# Patient Record
Sex: MALE | Race: OTHER | HISPANIC OR LATINO | URBAN - METROPOLITAN AREA
[De-identification: names, ages, dates, MRNs, and addresses within clinical notes are randomized per-mention and may not be internally consistent; named-entity substitution may affect disease eponyms.]

---

## 2020-03-01 ENCOUNTER — EMERGENCY (EMERGENCY)
Facility: HOSPITAL | Age: 50
LOS: 1 days | Discharge: HOME | End: 2020-03-01
Attending: EMERGENCY MEDICINE
Payer: COMMERCIAL

## 2020-03-01 VITALS
RESPIRATION RATE: 18 BRPM | HEIGHT: 68 IN | WEIGHT: 162.92 LBS | SYSTOLIC BLOOD PRESSURE: 130 MMHG | TEMPERATURE: 97 F | DIASTOLIC BLOOD PRESSURE: 83 MMHG | HEART RATE: 83 BPM | OXYGEN SATURATION: 97 %

## 2020-03-01 PROCEDURE — 99284 EMERGENCY DEPT VISIT MOD MDM: CPT

## 2020-03-01 RX ORDER — PANTOPRAZOLE SODIUM 20 MG/1
40 TABLET, DELAYED RELEASE ORAL ONCE
Refills: 0 | Status: COMPLETED | OUTPATIENT
Start: 2020-03-01 | End: 2020-03-01

## 2020-03-01 RX ORDER — ONDANSETRON 8 MG/1
4 TABLET, FILM COATED ORAL ONCE
Refills: 0 | Status: COMPLETED | OUTPATIENT
Start: 2020-03-01 | End: 2020-03-01

## 2020-03-01 RX ADMIN — PANTOPRAZOLE SODIUM 40 MILLIGRAM(S): 20 TABLET, DELAYED RELEASE ORAL at 23:49

## 2020-03-01 RX ADMIN — ONDANSETRON 4 MILLIGRAM(S): 8 TABLET, FILM COATED ORAL at 23:49

## 2020-03-02 VITALS
DIASTOLIC BLOOD PRESSURE: 70 MMHG | RESPIRATION RATE: 15 BRPM | HEART RATE: 92 BPM | TEMPERATURE: 98 F | OXYGEN SATURATION: 99 % | SYSTOLIC BLOOD PRESSURE: 115 MMHG

## 2020-03-02 LAB
ALBUMIN SERPL ELPH-MCNC: 3.5 G/DL — SIGNIFICANT CHANGE UP (ref 3.5–5)
ALP SERPL-CCNC: 51 U/L — SIGNIFICANT CHANGE UP (ref 40–120)
ALT FLD-CCNC: 26 U/L DA — SIGNIFICANT CHANGE UP (ref 10–60)
ANION GAP SERPL CALC-SCNC: 6 MMOL/L — SIGNIFICANT CHANGE UP (ref 5–17)
AST SERPL-CCNC: 11 U/L — SIGNIFICANT CHANGE UP (ref 10–40)
BASOPHILS # BLD AUTO: 0.02 K/UL — SIGNIFICANT CHANGE UP (ref 0–0.2)
BASOPHILS NFR BLD AUTO: 0.2 % — SIGNIFICANT CHANGE UP (ref 0–2)
BILIRUB SERPL-MCNC: 1.9 MG/DL — HIGH (ref 0.2–1.2)
BUN SERPL-MCNC: 15 MG/DL — SIGNIFICANT CHANGE UP (ref 7–18)
CALCIUM SERPL-MCNC: 8.1 MG/DL — LOW (ref 8.4–10.5)
CHLORIDE SERPL-SCNC: 106 MMOL/L — SIGNIFICANT CHANGE UP (ref 96–108)
CO2 SERPL-SCNC: 27 MMOL/L — SIGNIFICANT CHANGE UP (ref 22–31)
CREAT SERPL-MCNC: 1.04 MG/DL — SIGNIFICANT CHANGE UP (ref 0.5–1.3)
EOSINOPHIL # BLD AUTO: 0.15 K/UL — SIGNIFICANT CHANGE UP (ref 0–0.5)
EOSINOPHIL NFR BLD AUTO: 1.5 % — SIGNIFICANT CHANGE UP (ref 0–6)
GLUCOSE SERPL-MCNC: 97 MG/DL — SIGNIFICANT CHANGE UP (ref 70–99)
HCT VFR BLD CALC: 45.5 % — SIGNIFICANT CHANGE UP (ref 39–50)
HGB BLD-MCNC: 15.9 G/DL — SIGNIFICANT CHANGE UP (ref 13–17)
IMM GRANULOCYTES NFR BLD AUTO: 0.2 % — SIGNIFICANT CHANGE UP (ref 0–1.5)
LIDOCAIN IGE QN: 91 U/L — SIGNIFICANT CHANGE UP (ref 73–393)
LYMPHOCYTES # BLD AUTO: 0.93 K/UL — LOW (ref 1–3.3)
LYMPHOCYTES # BLD AUTO: 9.1 % — LOW (ref 13–44)
MCHC RBC-ENTMCNC: 33 PG — SIGNIFICANT CHANGE UP (ref 27–34)
MCHC RBC-ENTMCNC: 34.9 GM/DL — SIGNIFICANT CHANGE UP (ref 32–36)
MCV RBC AUTO: 94.4 FL — SIGNIFICANT CHANGE UP (ref 80–100)
MONOCYTES # BLD AUTO: 0.69 K/UL — SIGNIFICANT CHANGE UP (ref 0–0.9)
MONOCYTES NFR BLD AUTO: 6.8 % — SIGNIFICANT CHANGE UP (ref 2–14)
NEUTROPHILS # BLD AUTO: 8.37 K/UL — HIGH (ref 1.8–7.4)
NEUTROPHILS NFR BLD AUTO: 82.2 % — HIGH (ref 43–77)
NRBC # BLD: 0 /100 WBCS — SIGNIFICANT CHANGE UP (ref 0–0)
PLATELET # BLD AUTO: 187 K/UL — SIGNIFICANT CHANGE UP (ref 150–400)
POTASSIUM SERPL-MCNC: 3.4 MMOL/L — LOW (ref 3.5–5.3)
POTASSIUM SERPL-SCNC: 3.4 MMOL/L — LOW (ref 3.5–5.3)
PROT SERPL-MCNC: 7 G/DL — SIGNIFICANT CHANGE UP (ref 6–8.3)
RBC # BLD: 4.82 M/UL — SIGNIFICANT CHANGE UP (ref 4.2–5.8)
RBC # FLD: 12.7 % — SIGNIFICANT CHANGE UP (ref 10.3–14.5)
SODIUM SERPL-SCNC: 139 MMOL/L — SIGNIFICANT CHANGE UP (ref 135–145)
TROPONIN I SERPL-MCNC: <0.015 NG/ML — SIGNIFICANT CHANGE UP (ref 0–0.04)
TROPONIN I SERPL-MCNC: <0.015 NG/ML — SIGNIFICANT CHANGE UP (ref 0–0.04)
WBC # BLD: 10.18 K/UL — SIGNIFICANT CHANGE UP (ref 3.8–10.5)
WBC # FLD AUTO: 10.18 K/UL — SIGNIFICANT CHANGE UP (ref 3.8–10.5)

## 2020-03-02 PROCEDURE — 93005 ELECTROCARDIOGRAM TRACING: CPT

## 2020-03-02 PROCEDURE — 80053 COMPREHEN METABOLIC PANEL: CPT

## 2020-03-02 PROCEDURE — 99284 EMERGENCY DEPT VISIT MOD MDM: CPT | Mod: 25

## 2020-03-02 PROCEDURE — 83690 ASSAY OF LIPASE: CPT

## 2020-03-02 PROCEDURE — 96375 TX/PRO/DX INJ NEW DRUG ADDON: CPT

## 2020-03-02 PROCEDURE — 84484 ASSAY OF TROPONIN QUANT: CPT

## 2020-03-02 PROCEDURE — 36415 COLL VENOUS BLD VENIPUNCTURE: CPT

## 2020-03-02 PROCEDURE — 96374 THER/PROPH/DIAG INJ IV PUSH: CPT

## 2020-03-02 PROCEDURE — 85027 COMPLETE CBC AUTOMATED: CPT

## 2020-03-02 NOTE — ED PROVIDER NOTE - PATIENT PORTAL LINK FT
You can access the FollowMyHealth Patient Portal offered by [facility] by registering at the following website: [email or web address]. By joining [employer]’s FollowMyHealth portal, you will also be able to view your health information using other applications (apps) compatible with our system.

## 2020-03-02 NOTE — ED PROVIDER NOTE - CLINICAL SUMMARY MEDICAL DECISION MAKING FREE TEXT BOX
48yo M with hx HIV on HAART, CD4 count 500 in Nov 2019 presents with abd pain and vomiting. labs show neg trop x2, initial EKG shows <5mm STD in II, III but repeat shows no STD, patient denies chest pain, cmp/lipase wnl.   Discussed above with patient. On reeval no recurrence of epigastric pain or vomiting. Patient stable for discharge to f/u with PMD.

## 2020-03-02 NOTE — ED ADULT NURSE NOTE - OBJECTIVE STATEMENT
pt c/o of indigestion and vomiting x 2 started yesterday. Denies any SOB. Denies chest pain, dizziness, and diarrhea. Denies fever

## 2020-03-02 NOTE — ED ADULT NURSE NOTE - NSIMPLEMENTINTERV_GEN_ALL_ED
Implemented All Universal Safety Interventions:  [place] to call system. Call bell, personal items and telephone within reach. Instruct patient to call for assistance. Room bathroom lighting operational. Non-slip footwear when patient is off stretcher. Physically safe environment: no spills, clutter or unnecessary equipment. Stretcher in lowest position, wheels locked, appropriate side rails in place.

## 2020-03-02 NOTE — ED PROVIDER NOTE - NSFOLLOWUPINSTRUCTIONS_ED_ALL_ED_FT
Followup with PMD for reevaluation.  Return to ED if you develop fever>100.8F, severe abd pain or vomiting/unable to keep down liquids.

## 2020-03-02 NOTE — ED PROVIDER NOTE - OBJECTIVE STATEMENT
50yo M with hx HIV on HAART, CD4 count 500 in Nov 2019 presents with abd pain and vomiting. patient reports onset of epigastric abd disccomfort earlier yesterday associated with 2 episodes of vomiting. Currently feels acid reflux symptoms but denies abd pain. Denies diarrhea, fevers, sick contacts or recent travel.

## 2020-04-26 ENCOUNTER — MESSAGE (OUTPATIENT)
Age: 50
End: 2020-04-26

## 2020-04-30 ENCOUNTER — APPOINTMENT (OUTPATIENT)
Dept: DISASTER EMERGENCY | Facility: HOSPITAL | Age: 50
End: 2020-04-30

## 2020-04-30 LAB
SARS-COV-2 IGG SERPL IA-ACNC: 0 INDEX
SARS-COV-2 IGG SERPL QL IA: NEGATIVE

## 2021-05-09 ENCOUNTER — EMERGENCY (EMERGENCY)
Facility: HOSPITAL | Age: 51
LOS: 1 days | Discharge: HOME | End: 2021-05-09
Attending: STUDENT IN AN ORGANIZED HEALTH CARE EDUCATION/TRAINING PROGRAM
Payer: COMMERCIAL

## 2021-05-09 VITALS
HEIGHT: 68 IN | SYSTOLIC BLOOD PRESSURE: 168 MMHG | WEIGHT: 159.84 LBS | RESPIRATION RATE: 16 BRPM | DIASTOLIC BLOOD PRESSURE: 90 MMHG | OXYGEN SATURATION: 98 % | HEART RATE: 78 BPM | TEMPERATURE: 99 F

## 2021-05-09 VITALS
TEMPERATURE: 99 F | HEART RATE: 70 BPM | DIASTOLIC BLOOD PRESSURE: 90 MMHG | SYSTOLIC BLOOD PRESSURE: 155 MMHG | OXYGEN SATURATION: 97 % | RESPIRATION RATE: 17 BRPM

## 2021-05-09 PROCEDURE — 99284 EMERGENCY DEPT VISIT MOD MDM: CPT

## 2021-05-09 PROCEDURE — 73502 X-RAY EXAM HIP UNI 2-3 VIEWS: CPT | Mod: 26,LT

## 2021-05-09 PROCEDURE — 99284 EMERGENCY DEPT VISIT MOD MDM: CPT | Mod: 25

## 2021-05-09 PROCEDURE — 73552 X-RAY EXAM OF FEMUR 2/>: CPT | Mod: 26,LT

## 2021-05-09 PROCEDURE — 73502 X-RAY EXAM HIP UNI 2-3 VIEWS: CPT

## 2021-05-09 PROCEDURE — 73552 X-RAY EXAM OF FEMUR 2/>: CPT

## 2021-05-09 RX ORDER — LIDOCAINE 4 G/100G
1 CREAM TOPICAL ONCE
Refills: 0 | Status: COMPLETED | OUTPATIENT
Start: 2021-05-09 | End: 2021-05-09

## 2021-05-09 RX ORDER — IBUPROFEN 200 MG
600 TABLET ORAL ONCE
Refills: 0 | Status: COMPLETED | OUTPATIENT
Start: 2021-05-09 | End: 2021-05-09

## 2021-05-09 RX ADMIN — Medication 600 MILLIGRAM(S): at 09:21

## 2021-05-09 RX ADMIN — LIDOCAINE 1 PATCH: 4 CREAM TOPICAL at 09:21

## 2021-05-09 NOTE — ED PROVIDER NOTE - PROGRESS NOTE DETAILS
took over from Dr. Kasper. patient had left thigh pain after a fall. non-tender on exam. ambulating without difficulty. no emergent findings on xray. will discharge. Valentin Wise

## 2021-05-09 NOTE — ED PROVIDER NOTE - PATIENT PORTAL LINK FT
You can access the FollowMyHealth Patient Portal offered by Health system by registering at the following website: [email or web address]. By joining [employer]’s FollowMyHealth portal, you will also be able to view your health information using other applications (apps) compatible with our system.

## 2021-05-09 NOTE — ED PROVIDER NOTE - NSFOLLOWUPINSTRUCTIONS_ED_ALL_ED_FT
You were seen in the emergency department for thigh pain after a fall.     Please follow-up with your primary care doctor in the next 24-48 hours.     Please take Ibuprofen and Tylenol as prescribed on the bottles for pain control.     If you have any worsening symptoms, severe leg pain or swelling, chest pain or shortness of breath, please return to the emergency department.

## 2021-05-09 NOTE — ED ADULT NURSE NOTE - ALCOHOL PRE SCREEN (AUDIT - C)
Pt discharged to home. Instructed to follow up with her PCP and GI specialist, and to follow up with them up tomorrow. Instructed to focus on clear liquids, and advance as tolerated. Instructed to take her meds as prescribed, and to return for worsening pain/fever/vomiting/other concerns. Pt affect calm/conversant, respirations regular/non labored/skin warm and dry. No distress noted.
Pt noted to be resting in bed calmly texting on cell phone. Affect calm/conversant, respirations regular/non labored, skin warm and dry. Adult non verbal pain scale 0. Pt given meds per order; purpose of medications explained/understood. Affect is calm, respirations regular/non labored, skin warm/dry. Dr Felicity Donald has reevaluated pt at bedside; being discharged to home.
Statement Selected

## 2021-05-09 NOTE — ED ADULT NURSE NOTE - ISOLATION TYPE:
Lai Del Rosario  65 y.o. male      1. Atherosclerosis of native coronary artery of native heart without angina pectoris    2. Mixed hyperlipidemia    3. Essential hypertension        History of Present Illness:  Mr. Del Rosario is here for follow-up of his above-stated problems.  He has done well from a symptom standpoint with no chest pain, shortness of breath, palpitation, dizziness or syncope.  He has been physically quite active without any restrictions.  Exam today was unremarkable and his heart rate and blood pressure were in the normal range.  His lipid profile checked in June this year showed HDL of 64 and LDL of 44.    SUBJECTIVE    Allergies   Allergen Reactions   • Meperidine Rash         Past Medical History:   Diagnosis Date   • Hyperlipidemia    • Hypertension          Past Surgical History:   Procedure Laterality Date   • MOLE REMOVAL           Family History   Problem Relation Age of Onset   • Heart disease Father    • Heart attack Father          Social History     Socioeconomic History   • Marital status:      Spouse name: Not on file   • Number of children: Not on file   • Years of education: Not on file   • Highest education level: Not on file   Tobacco Use   • Smoking status: Never Smoker   • Smokeless tobacco: Current User     Types: Chew   Substance and Sexual Activity   • Alcohol use: Yes     Alcohol/week: 2.0 standard drinks     Types: 2 Cans of beer per week     Comment: social   • Drug use: No   • Sexual activity: Defer         Current Outpatient Medications   Medication Sig Dispense Refill   • atorvastatin (LIPITOR) 10 MG tablet Take 1 tablet by mouth Daily. 90 tablet 3   • irbesartan (AVAPRO) 150 MG tablet Take 1 tablet by mouth Daily. 90 tablet 3   • metoprolol succinate XL (TOPROL-XL) 50 MG 24 hr tablet Take 1 tablet by mouth Daily. 90 tablet 3     No current facility-administered medications for this visit.          OBJECTIVE    /80 (BP Location: Left arm, Patient  "Position: Sitting, Cuff Size: Adult)   Pulse 71   Ht 182.9 cm (72\")   Wt 81.2 kg (179 lb)   SpO2 99%   BMI 24.28 kg/m²         Review of Systems     Constitutional:  Denies recent weight loss, weight gain, fever or chills, no change in exercise tolerance     HENT:  Denies any hearing loss, epistaxis, hoarseness, or difficulty speaking.     Eyes: Wears eyeglasses or contact lenses     Respiratory:  Denies dyspnea with exertion,no cough, wheezing, or hemoptysis.     Cardiovascular: Negative for palpations, chest pain, orthopnea, PND, peripheral edema, syncope, or claudication.     Gastrointestinal:  Denies change in bowel habits, dyspepsia, ulcer disease, hematochezia, or melena.     Endocrine: Negative for cold intolerance, heat intolerance, polydipsia, polyphagia and polyuria.     Genitourinary: Negative.      Musculoskeletal: Denies any history of arthritic symptoms or back problems     Neurological:  Denies any history of recurrent headaches, strokes, TIA, or seizure disorder.     Hematological: Denies any food allergies, seasonal allergies, bleeding disorders, or lymphadenopathy.     Psychiatric/Behavioral: Denies any history of depression, substance abuse, or change in cognitive function.         Physical Exam     Constitutional: Cooperative, alert and oriented,  in no acute distress.     HENT:   Head: Normocephalic, normal hair patterns, no masses or tenderness.  Ears, Nose, and Throat: No gross abnormalities. No pallor or cyanosis.   Eyes: EOMS intact, PERRL, conjunctivae and lids unremarkable. Fundoscopic exam and visual fields not performed.   Neck: No palpable masses or adenopathy, no thyromegaly, no JVD, carotid pulses are full and equal bilaterally and without  Bruits.     Cardiovascular: Regular rhythm, S1 and S2 normal, no S3 or S4.  No murmurs, gallops, or rubs detected.     Pulmonary/Chest: Chest: normal symmetry, no tenderness to palpation, normal respiratory excursion, no intercostal " retraction, no use of accessory muscles.            Pulmonary: Normal breath sounds. No rales or ronchi.    Abdominal: Abdomen soft, bowel sounds normoactive, no masses, no hepatosplenomegaly, non-tender, no bruits.     Musculoskeletal: No deformities, clubbing, cyanosis, erythema, or edema observed. There are no spinal abnormalities noted. Normal muscle strength and tone. Pulses full and equal in all extremities, no bruits auscultated.     Neurological: No gross motor or sensory deficits noted, affect appropriate, oriented to time, person, place.     Skin: Warm and dry to the touch, no apparent skin lesions or masses noted.     Psychiatric: He has a normal mood and affect. His behavior is normal. Judgment and thought content normal.         Procedures      Lab Results   Component Value Date    WBC 6.48 06/03/2020    HGB 13.4 06/03/2020    HCT 38.4 06/03/2020    MCV 90.6 06/03/2020     06/03/2020     Lab Results   Component Value Date    GLUCOSE 80 06/03/2020    BUN 8 06/03/2020    CREATININE 0.80 06/03/2020    EGFRIFNONA 97 06/03/2020    BCR 10.0 06/03/2020    CO2 25.6 06/03/2020    CALCIUM 9.0 06/03/2020    ALBUMIN 4.60 06/03/2020    LABIL2 1.6 03/07/2019    AST 44 (H) 06/03/2020    ALT 46 (H) 06/03/2020     Lab Results   Component Value Date    CHOL 118 06/03/2020    CHOL 175 09/19/2017     Lab Results   Component Value Date    TRIG 50 06/03/2020    TRIG 67 09/19/2017     Lab Results   Component Value Date    HDL 64 (H) 06/03/2020    HDL 69 09/19/2017     No components found for: LDLCALC  Lab Results   Component Value Date    LDL 44 06/03/2020     09/19/2017     No results found for: HDLLDLRATIO  No components found for: CHOLHDL  No results found for: HGBA1C  No results found for: TSH, N1GBRBN, B9RJPGL, THYROIDAB        ASSESSMENT AND PLAN  Mr. Del Rosario is stable with regards to his heart with no evidence of angina, arrhythmia or congestive heart failure.  He is physically active without any  restrictions.  I have continued antihypertensive therapy with Avapro and metoprolol succinate and lipid-lowering therapy with atorvastatin has been continued.   Medications were refilled.    Diagnoses and all orders for this visit:    1. Atherosclerosis of native coronary artery of native heart without angina pectoris (Primary)    2. Mixed hyperlipidemia    3. Essential hypertension    Other orders  -     atorvastatin (LIPITOR) 10 MG tablet; Take 1 tablet by mouth Daily.  Dispense: 90 tablet; Refill: 3  -     irbesartan (AVAPRO) 150 MG tablet; Take 1 tablet by mouth Daily.  Dispense: 90 tablet; Refill: 3  -     metoprolol succinate XL (TOPROL-XL) 50 MG 24 hr tablet; Take 1 tablet by mouth Daily.  Dispense: 90 tablet; Refill: 3        Patient's Body mass index is 24.28 kg/m². BMI is within normal parameters. No follow-up required..  Patient is a non-smoker    Rhett Jimenez MD  12/9/2020  16:54 CST   None

## 2021-05-09 NOTE — ED ADULT NURSE NOTE - OBJECTIVE STATEMENT
s/p fall Fell down from 2 steps at home with pain on left thigh happened at 930pm.pain scale 5/10. denies LOC & head trauma

## 2021-05-09 NOTE — ED PROVIDER NOTE - OBJECTIVE STATEMENT
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 From pain has been having difficulty ambulating normally since incident and unable to bear full weight on left leg. Denies any gross deformity, any numbness/tingling/back pain/neck pain/headaches. Has not taking anything yet for pain.

## 2021-11-30 ENCOUNTER — EMERGENCY (EMERGENCY)
Facility: HOSPITAL | Age: 51
LOS: 1 days | Discharge: HOME | End: 2021-11-30
Attending: EMERGENCY MEDICINE
Payer: COMMERCIAL

## 2021-11-30 VITALS
RESPIRATION RATE: 18 BRPM | OXYGEN SATURATION: 95 % | HEART RATE: 70 BPM | SYSTOLIC BLOOD PRESSURE: 133 MMHG | TEMPERATURE: 98 F | DIASTOLIC BLOOD PRESSURE: 82 MMHG

## 2021-11-30 VITALS
WEIGHT: 164.91 LBS | DIASTOLIC BLOOD PRESSURE: 94 MMHG | OXYGEN SATURATION: 96 % | HEIGHT: 68 IN | RESPIRATION RATE: 18 BRPM | TEMPERATURE: 99 F | HEART RATE: 116 BPM | SYSTOLIC BLOOD PRESSURE: 115 MMHG

## 2021-11-30 LAB
RAPID RVP RESULT: DETECTED
RV+EV RNA SPEC QL NAA+PROBE: DETECTED
SARS-COV-2 RNA SPEC QL NAA+PROBE: SIGNIFICANT CHANGE UP

## 2021-11-30 PROCEDURE — 93010 ELECTROCARDIOGRAM REPORT: CPT

## 2021-11-30 PROCEDURE — 94640 AIRWAY INHALATION TREATMENT: CPT

## 2021-11-30 PROCEDURE — 93005 ELECTROCARDIOGRAM TRACING: CPT

## 2021-11-30 PROCEDURE — 0225U NFCT DS DNA&RNA 21 SARSCOV2: CPT

## 2021-11-30 PROCEDURE — 71046 X-RAY EXAM CHEST 2 VIEWS: CPT

## 2021-11-30 PROCEDURE — 99284 EMERGENCY DEPT VISIT MOD MDM: CPT

## 2021-11-30 PROCEDURE — 96372 THER/PROPH/DIAG INJ SC/IM: CPT

## 2021-11-30 PROCEDURE — 71046 X-RAY EXAM CHEST 2 VIEWS: CPT | Mod: 26

## 2021-11-30 PROCEDURE — 99284 EMERGENCY DEPT VISIT MOD MDM: CPT | Mod: 25

## 2021-11-30 RX ORDER — KETOROLAC TROMETHAMINE 30 MG/ML
30 SYRINGE (ML) INJECTION ONCE
Refills: 0 | Status: DISCONTINUED | OUTPATIENT
Start: 2021-11-30 | End: 2021-11-30

## 2021-11-30 RX ORDER — ALBUTEROL 90 UG/1
2 AEROSOL, METERED ORAL ONCE
Refills: 0 | Status: COMPLETED | OUTPATIENT
Start: 2021-11-30 | End: 2021-11-30

## 2021-11-30 RX ADMIN — Medication 30 MILLIGRAM(S): at 09:08

## 2021-11-30 RX ADMIN — ALBUTEROL 2 PUFF(S): 90 AEROSOL, METERED ORAL at 09:08

## 2021-11-30 RX ADMIN — Medication 30 MILLIGRAM(S): at 09:53

## 2021-11-30 NOTE — ED PROVIDER NOTE - OBJECTIVE STATEMENT
51yoM with h/o HIV states sees doc q6mths, compliant with meds, and HIV is controlled, though does not recall viral load/CD4, presents with cough x 4 days. Associated rhinorrhea, postnasal drip, ST, and HA. Also reports some nonradiating midsternal chest pressure but essentially with coughing only. Denies SOB, fever, LE pain or swelling, v/d, abd pain, dysuria, rashes, and all other symptoms. 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 Works in the hospital. Declines , uses himself.  51yoM with h/o HIV states sees doc q6mths, compliant with meds, and HIV is controlled, though does not recall viral load/CD4, presents with cough x 4 days. Associated rhinorrhea, postnasal drip, ST, and HA. Also reports some nonradiating midsternal chest pressure but essentially with coughing only. Denies SOB, fever, LE pain or swelling, v/d, abd pain, dysuria, rashes, and all other symptoms. 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 Works in the hospital.

## 2021-11-30 NOTE — ED PROVIDER NOTE - CLINICAL SUMMARY MEDICAL DECISION MAKING FREE TEXT BOX
No e/o RAD, PTX, PNA, or fluid overload on exam or CXR _______. No e/o cardiomyopathy or myocarditis. No e/o RAD, PTX, PNA, or fluid overload on exam or CXR. No e/o cardiomyopathy or myocarditis. Cough c/w viral URI, RVP sent. Given alb inhaler with significant improvement in cough. Patient is well appearing, NAD, afebrile, hemodynamically stable, no WOB, appears well hydrated. Any available tests and studies were discussed with patient. Discharged with instructions in further symptomatic care, return precautions, and need for PMD f/u.

## 2021-11-30 NOTE — ED PROVIDER NOTE - NS ED ROS FT
All systems were reviewed and were otherwise negative. I have personally evaluated and examined the patient. The Attending was available to me as a supervising provider if needed.

## 2021-11-30 NOTE — ED PROVIDER NOTE - PATIENT PORTAL LINK FT
You can access the FollowMyHealth Patient Portal offered by [facility] by registering at the following website: [email or web address]. By joining [employer]’s FollowMyHealth portal, you will also be able to view your health information using other applications (apps) compatible with our system.

## 2021-11-30 NOTE — ED PROVIDER NOTE - PHYSICAL EXAMINATION
Afebrile, hemodynamically stable, saturating well on RA  NAD, well appearing, sitting comfortably in bed, no WOB, speaking full sentences  Head NCAT  EOMI grossly, anicteric  MMM, erythema with no lesions/exudates  No JVD or HJR  RRR, nml S1/S2, no m/r/g  Lungs CTAB, no w/r/r  Abd soft, NT, ND, nml BS, no rebound or guarding  AAO, CN's 3-12 grossly intact  STANTON spontaneously, no leg cyanosis or edema  Skin warm, well perfused, no rashes or hives

## 2021-11-30 NOTE — ED PROVIDER NOTE - NSFOLLOWUPINSTRUCTIONS_ED_ALL_ED_FT
Please follow up with your primary care doctor in 1-2 days.  Please use the albuterol 2 puffs every 4 hours as needed for cough.  Please return to the emergency department if you have worsening pain, shortness of breath, dizziness, vomiting, fever, or any other symptoms.      Acute Cough    WHAT YOU NEED TO KNOW:    An acute cough can last up to 3 weeks. Common causes of an acute cough include a cold, allergies, or a lung infection.    DISCHARGE INSTRUCTIONS:    Return to the emergency department if:   •You have trouble breathing or feel short of breath.  •You cough up blood, or you see blood in your mucus.  •You faint or feel weak or dizzy.  •You have chest pain when you cough or take a deep breath.  •You have new wheezing.    Contact your healthcare provider if:   •You have a fever.  •Your cough lasts longer than 4 weeks.  •Your symptoms do not improve with treatment.  •You have questions or concerns about your condition or care.    Medicines:   •Medicines may be needed to stop the cough, decrease swelling in your airways, or help open your airways. Medicine may also be given to help you cough up mucus. Ask your healthcare provider what over-the-counter medicines you can take. If you have an infection caused by bacteria, you may need antibiotics.  •Take your medicine as directed. Contact your healthcare provider if you think your medicine is not helping or if you have side effects. Tell him or her if you are allergic to any medicine. Keep a list of the medicines, vitamins, and herbs you take. Include the amounts, and when and why you take them. Bring the list or the pill bottles to follow-up visits. Carry your medicine list with you in case of an emergency.    Manage your symptoms:   •Do not smoke and stay away from others who smoke. Nicotine and other chemicals in cigarettes and cigars can cause lung damage and make your cough worse. Ask your healthcare provider for information if you currently smoke and need help to quit. E-cigarettes or smokeless tobacco still contain nicotine. Talk to your healthcare provider before you use these products.  •Drink extra liquids as directed. Liquids will help thin and loosen mucus so you can cough it up. Liquids will also help prevent dehydration. Examples of good liquids to drink include water, fruit juice, and broth. Do not drink liquids that contain caffeine. Caffeine can increase your risk for dehydration. Ask your healthcare provider how much liquid to drink each day.  •Rest as directed. Do not do activities that make your cough worse, such as exercise.  •Use a humidifier or vaporizer. Use a cool mist humidifier or a vaporizer to increase air moisture in your home. This may make it easier for you to breathe and help decrease your cough.  •Eat 2 to 5 mL of honey 2 times each day. Honey can help thin mucus and decrease your cough.  •Use cough drops or lozenges. These can help decrease throat irritation and your cough.    Follow up with your healthcare provider as directed: Write down your questions so you remember to ask them during your visits.

## 2021-11-30 NOTE — ED ADULT NURSE NOTE - NSIMPLEMENTINTERV_GEN_ALL_ED
Implemented All Universal Safety Interventions:  [place] to call system. Call bell, personal items and telephone within reach. Instruct patient to call for assistance. Room bathroom lighting operational. Non-slip footwear when patient is off stretcher. Physically safe environment: no spills, clutter or unnecessary equipment. Stretcher in lowest position, wheels locked, appropriate side rails in place.

## 2022-05-30 ENCOUNTER — EMERGENCY (EMERGENCY)
Facility: HOSPITAL | Age: 52
LOS: 1 days | Discharge: HOME | End: 2022-05-30
Attending: EMERGENCY MEDICINE
Payer: COMMERCIAL

## 2022-05-30 VITALS
HEIGHT: 68 IN | DIASTOLIC BLOOD PRESSURE: 85 MMHG | HEART RATE: 124 BPM | RESPIRATION RATE: 16 BRPM | TEMPERATURE: 100 F | WEIGHT: 174.17 LBS | SYSTOLIC BLOOD PRESSURE: 140 MMHG

## 2022-05-30 PROCEDURE — 99285 EMERGENCY DEPT VISIT HI MDM: CPT

## 2022-05-30 PROCEDURE — 99053 MED SERV 10PM-8AM 24 HR FAC: CPT

## 2022-05-30 NOTE — ED ADULT TRIAGE NOTE - CHIEF COMPLAINT QUOTE
" I have headache all day , I take tylenol it don't work "  Started this morning, my arms and legs hurts "

## 2022-05-31 VITALS
TEMPERATURE: 99 F | SYSTOLIC BLOOD PRESSURE: 124 MMHG | HEART RATE: 99 BPM | OXYGEN SATURATION: 97 % | DIASTOLIC BLOOD PRESSURE: 83 MMHG | RESPIRATION RATE: 17 BRPM

## 2022-05-31 LAB
ANION GAP SERPL CALC-SCNC: 7 MMOL/L — SIGNIFICANT CHANGE UP (ref 5–17)
APPEARANCE UR: CLEAR — SIGNIFICANT CHANGE UP
BASOPHILS # BLD AUTO: 0.04 K/UL — SIGNIFICANT CHANGE UP (ref 0–0.2)
BASOPHILS NFR BLD AUTO: 0.2 % — SIGNIFICANT CHANGE UP (ref 0–2)
BILIRUB UR-MCNC: NEGATIVE — SIGNIFICANT CHANGE UP
BUN SERPL-MCNC: 19 MG/DL — HIGH (ref 7–18)
CALCIUM SERPL-MCNC: 9.6 MG/DL — SIGNIFICANT CHANGE UP (ref 8.4–10.5)
CHLORIDE SERPL-SCNC: 101 MMOL/L — SIGNIFICANT CHANGE UP (ref 96–108)
CO2 SERPL-SCNC: 27 MMOL/L — SIGNIFICANT CHANGE UP (ref 22–31)
COLOR SPEC: YELLOW — SIGNIFICANT CHANGE UP
CREAT SERPL-MCNC: 1.25 MG/DL — SIGNIFICANT CHANGE UP (ref 0.5–1.3)
DIFF PNL FLD: ABNORMAL
EGFR: 69 ML/MIN/1.73M2 — SIGNIFICANT CHANGE UP
EOSINOPHIL # BLD AUTO: 0.04 K/UL — SIGNIFICANT CHANGE UP (ref 0–0.5)
EOSINOPHIL NFR BLD AUTO: 0.2 % — SIGNIFICANT CHANGE UP (ref 0–6)
GLUCOSE SERPL-MCNC: 104 MG/DL — HIGH (ref 70–99)
GLUCOSE UR QL: NEGATIVE — SIGNIFICANT CHANGE UP
HCT VFR BLD CALC: 47.1 % — SIGNIFICANT CHANGE UP (ref 39–50)
HGB BLD-MCNC: 17 G/DL — SIGNIFICANT CHANGE UP (ref 13–17)
IMM GRANULOCYTES NFR BLD AUTO: 0.5 % — SIGNIFICANT CHANGE UP (ref 0–1.5)
KETONES UR-MCNC: ABNORMAL
LEUKOCYTE ESTERASE UR-ACNC: NEGATIVE — SIGNIFICANT CHANGE UP
LYMPHOCYTES # BLD AUTO: 10.4 % — LOW (ref 13–44)
LYMPHOCYTES # BLD AUTO: 2 K/UL — SIGNIFICANT CHANGE UP (ref 1–3.3)
MCHC RBC-ENTMCNC: 33.5 PG — SIGNIFICANT CHANGE UP (ref 27–34)
MCHC RBC-ENTMCNC: 36.1 GM/DL — HIGH (ref 32–36)
MCV RBC AUTO: 92.9 FL — SIGNIFICANT CHANGE UP (ref 80–100)
MONOCYTES # BLD AUTO: 1.47 K/UL — HIGH (ref 0–0.9)
MONOCYTES NFR BLD AUTO: 7.6 % — SIGNIFICANT CHANGE UP (ref 2–14)
NEUTROPHILS # BLD AUTO: 15.66 K/UL — HIGH (ref 1.8–7.4)
NEUTROPHILS NFR BLD AUTO: 81.1 % — HIGH (ref 43–77)
NITRITE UR-MCNC: NEGATIVE — SIGNIFICANT CHANGE UP
NRBC # BLD: 0 /100 WBCS — SIGNIFICANT CHANGE UP (ref 0–0)
PH UR: 5 — SIGNIFICANT CHANGE UP (ref 5–8)
PLATELET # BLD AUTO: 225 K/UL — SIGNIFICANT CHANGE UP (ref 150–400)
POTASSIUM SERPL-MCNC: 4.3 MMOL/L — SIGNIFICANT CHANGE UP (ref 3.5–5.3)
POTASSIUM SERPL-SCNC: 4.3 MMOL/L — SIGNIFICANT CHANGE UP (ref 3.5–5.3)
PROT UR-MCNC: NEGATIVE — SIGNIFICANT CHANGE UP
RAPID RVP RESULT: SIGNIFICANT CHANGE UP
RAPID RVP RESULT: SIGNIFICANT CHANGE UP
RBC # BLD: 5.07 M/UL — SIGNIFICANT CHANGE UP (ref 4.2–5.8)
RBC # FLD: 12.4 % — SIGNIFICANT CHANGE UP (ref 10.3–14.5)
SARS-COV-2 RNA SPEC QL NAA+PROBE: SIGNIFICANT CHANGE UP
SARS-COV-2 RNA SPEC QL NAA+PROBE: SIGNIFICANT CHANGE UP
SODIUM SERPL-SCNC: 135 MMOL/L — SIGNIFICANT CHANGE UP (ref 135–145)
SP GR SPEC: 1.02 — SIGNIFICANT CHANGE UP (ref 1.01–1.02)
UROBILINOGEN FLD QL: NEGATIVE — SIGNIFICANT CHANGE UP
WBC # BLD: 19.3 K/UL — HIGH (ref 3.8–10.5)
WBC # FLD AUTO: 19.3 K/UL — HIGH (ref 3.8–10.5)

## 2022-05-31 PROCEDURE — 71046 X-RAY EXAM CHEST 2 VIEWS: CPT

## 2022-05-31 PROCEDURE — 96375 TX/PRO/DX INJ NEW DRUG ADDON: CPT

## 2022-05-31 PROCEDURE — 70450 CT HEAD/BRAIN W/O DYE: CPT | Mod: 26,MA

## 2022-05-31 PROCEDURE — 80048 BASIC METABOLIC PNL TOTAL CA: CPT

## 2022-05-31 PROCEDURE — 96374 THER/PROPH/DIAG INJ IV PUSH: CPT

## 2022-05-31 PROCEDURE — 85025 COMPLETE CBC W/AUTO DIFF WBC: CPT

## 2022-05-31 PROCEDURE — 71046 X-RAY EXAM CHEST 2 VIEWS: CPT | Mod: 26

## 2022-05-31 PROCEDURE — 81001 URINALYSIS AUTO W/SCOPE: CPT

## 2022-05-31 PROCEDURE — 36415 COLL VENOUS BLD VENIPUNCTURE: CPT

## 2022-05-31 PROCEDURE — 70450 CT HEAD/BRAIN W/O DYE: CPT | Mod: MA

## 2022-05-31 PROCEDURE — 0225U NFCT DS DNA&RNA 21 SARSCOV2: CPT

## 2022-05-31 PROCEDURE — 99285 EMERGENCY DEPT VISIT HI MDM: CPT | Mod: 25

## 2022-05-31 RX ORDER — ACETAMINOPHEN 500 MG
650 TABLET ORAL ONCE
Refills: 0 | Status: COMPLETED | OUTPATIENT
Start: 2022-05-31 | End: 2022-05-31

## 2022-05-31 RX ORDER — DIPHENHYDRAMINE HCL 50 MG
25 CAPSULE ORAL ONCE
Refills: 0 | Status: COMPLETED | OUTPATIENT
Start: 2022-05-31 | End: 2022-05-31

## 2022-05-31 RX ORDER — ACETAMINOPHEN 500 MG
2 TABLET ORAL
Qty: 20 | Refills: 0
Start: 2022-05-31

## 2022-05-31 RX ORDER — METOCLOPRAMIDE HCL 10 MG
10 TABLET ORAL ONCE
Refills: 0 | Status: COMPLETED | OUTPATIENT
Start: 2022-05-31 | End: 2022-05-31

## 2022-05-31 RX ORDER — ACETAMINOPHEN 500 MG
650 TABLET ORAL ONCE
Refills: 0 | Status: DISCONTINUED | OUTPATIENT
Start: 2022-05-31 | End: 2022-06-03

## 2022-05-31 RX ADMIN — Medication 25 MILLIGRAM(S): at 01:23

## 2022-05-31 RX ADMIN — Medication 10 MILLIGRAM(S): at 01:23

## 2022-05-31 RX ADMIN — Medication 650 MILLIGRAM(S): at 01:24

## 2022-05-31 NOTE — ED PROVIDER NOTE - OBJECTIVE STATEMENT
52 y/o male with no medical or surgical history comes in with a chief complaint of a diffused headache that started at 8am and progressively worsened. Patient has headache associated with generalized body aches especially to bilateral legs. He denies any fever, loss of consciousness, motor weakness or neck pain. NKDA.

## 2022-05-31 NOTE — ED ADULT NURSE NOTE - OBJECTIVE STATEMENT
No significant past surgical history
c/o headache since yesterday.pain scale 7/10 medication given as ordered

## 2022-05-31 NOTE — ED PROVIDER NOTE - NSFOLLOWUPCLINICS_GEN_ALL_ED_FT
[facility]  Internal Medicine  [address] [place] [address]  Phone: [phone]  Fax: [phone]

## 2022-05-31 NOTE — ED PROVIDER NOTE - CLINICAL SUMMARY MEDICAL DECISION MAKING FREE TEXT BOX
4a- Pt is asymptomatic, denies current headache, no meningeal signs. Pt requesting discharge. Leukocytosis noted, given pt's hx of body aches, suspect probable viral syndrome. Will also check CXR and urinalysis. 4a- Pt is asymptomatic, denies current headache, no meningeal signs. Pt requesting discharge. Leukocytosis noted, given pt's hx of body aches, suspect probable viral syndrome. Will also check CXR and urinalysis.  730a- Pt remains well and asymptomatic. Lab requested re-sample of RVP. pt will receive results via text.  Pt is well appearing, has no new complaints and able to walk with normal gait. Pt is stable for discharge and follow up with medical doctor. Pt educated on care and need for follow up. Discussed anticipatory guidance and return precautions. Questions answered. I had a detailed discussion with the patient and/or guardian regarding the historical points, exam findings, and any diagnostic results supporting the discharge diagnosis.

## 2022-05-31 NOTE — ED PROVIDER NOTE - PATIENT PORTAL LINK FT
You can access the FollowMyHealth Patient Portal offered by [facility] by registering at the following website: [email or web address]. By joining [employer]’s FollowMyHealth portal, you will also be able to view your health information using other applications (apps) compatible with our system.

## 2022-05-31 NOTE — ED PROVIDER NOTE - NSFOLLOWUPINSTRUCTIONS_ED_ALL_ED_FT
A headache is pain or discomfort felt around the head or neck area. The specific cause of a headache may not be found. There are many causes and types of headaches. A few common ones are:  •Tension headaches.      •Migraine headaches.      •Cluster headaches.      •Chronic daily headaches.        Follow these instructions at home:    Watch your condition for any changes. Let your health care provider know about them. Take these steps to help with your condition:      Managing pain                   •Take over-the-counter and prescription medicines only as told by your health care provider.      •Lie down in a dark, quiet room when you have a headache.    •If directed, put ice on your head and neck area:  •Put ice in a plastic bag.      •Place a towel between your skin and the bag.      •Leave the ice on for 20 minutes, 2–3 times per day.      •If directed, apply heat to the affected area. Use the heat source that your health care provider recommends, such as a moist heat pack or a heating pad.  •Place a towel between your skin and the heat source.      •Leave the heat on for 20–30 minutes.      •Remove the heat if your skin turns bright red. This is especially important if you are unable to feel pain, heat, or cold. You may have a greater risk of getting burned.        •Keep lights dim if bright lights bother you or make your headaches worse.      Eating and drinking     •Eat meals on a regular schedule.    •If you drink alcohol:•Limit how much you use to:   •0–1 drink a day for women.       • 0–2 drinks a day for men.         •Be aware of how much alcohol is in your drink. In the [place] one drink equals one 12 oz bottle of beer (355 mL), one 5 oz glass of wine (148 mL), or one 1½ oz glass of hard liquor (44 mL).        •Stop drinking caffeine, or decrease the amount of caffeine you drink.        General instructions    •Keep a headache journal to help find out what may trigger your headaches. For example, write down:  •What you eat and drink.      •How much sleep you get.      •Any change to your diet or medicines.        •Try massage or other relaxation techniques.      •Limit stress.      •Sit up straight, and do not tense your muscles.      • Do not use any products that contain nicotine or tobacco, such as cigarettes, e-cigarettes, and chewing tobacco. If you need help quitting, ask your health care provider.      •Exercise regularly as told by your health care provider.      •Sleep on a regular schedule. Get 7–9 hours of sleep each night, or the amount recommended by your health care provider.      •Keep all follow-up visits as told by your health care provider. This is important.        Contact a health care provider if:    •Your symptoms are not helped by medicine.      •You have a headache that is different from the usual headache.      •You have nausea or you vomit.      •You have a fever.        Get help right away if:    •Your headache becomes severe quickly.      •Your headache gets worse after moderate to intense physical activity.      •You have repeated vomiting.      •You have a stiff neck.      •You have a loss of vision.      •You have problems with speech.      •You have pain in the eye or ear.      •You have muscular weakness or loss of muscle control.      •You lose your balance or have trouble walking.      •You feel faint or pass out.      •You have confusion.      •You have a seizure.        Summary    •A headache is pain or discomfort felt around the head or neck area.      •There are many causes and types of headaches. In some cases, the cause may not be found.      •Keep a headache journal to help find out what may trigger your headaches. Watch your condition for any changes. Let your health care provider know about them.      •Contact a health care provider if you have a headache that is different from the usual headache, or if your symptoms are not helped by medicine.      •Get help right away if your headache becomes severe, you vomit, you have a loss of vision, you lose your balance, or you have a seizure.      This information is not intended to replace advice given to you by your health care provider. Make sure you discuss any questions you have with your health care provider.

## 2022-08-18 ENCOUNTER — EMERGENCY (EMERGENCY)
Facility: HOSPITAL | Age: 52
LOS: 1 days | Discharge: HOME | End: 2022-08-18
Attending: EMERGENCY MEDICINE
Payer: COMMERCIAL

## 2022-08-18 VITALS
HEIGHT: 68 IN | OXYGEN SATURATION: 96 % | HEART RATE: 77 BPM | RESPIRATION RATE: 17 BRPM | SYSTOLIC BLOOD PRESSURE: 132 MMHG | DIASTOLIC BLOOD PRESSURE: 89 MMHG | TEMPERATURE: 99 F

## 2022-08-18 PROCEDURE — 99283 EMERGENCY DEPT VISIT LOW MDM: CPT

## 2022-08-18 PROCEDURE — 0225U NFCT DS DNA&RNA 21 SARSCOV2: CPT

## 2022-08-18 PROCEDURE — 99284 EMERGENCY DEPT VISIT MOD MDM: CPT

## 2022-08-18 NOTE — ED PROVIDER NOTE - CHPI ED SYMPTOMS NEG
no abdominal pain/no cough/no diarrhea/no fever/no rash/no shortness of breath/no chills/no decreased eating/drinking

## 2022-08-18 NOTE — ED PROVIDER NOTE - PATIENT PORTAL LINK FT
You can access the FollowMyHealth Patient Portal offered by [facility] by registering at the following website: [email or web address]. By joining [employer]’s FollowMyHealth portal, you will also be able to view your health information using other applications (apps) compatible with our system.

## 2022-08-19 LAB
B PERT DNA SPEC QL NAA+PROBE: SIGNIFICANT CHANGE UP
C PNEUM DNA SPEC QL NAA+PROBE: SIGNIFICANT CHANGE UP
FLUAV H1 2009 PAND RNA SPEC QL NAA+PROBE: SIGNIFICANT CHANGE UP
FLUAV H1 RNA SPEC QL NAA+PROBE: SIGNIFICANT CHANGE UP
FLUAV H3 RNA SPEC QL NAA+PROBE: SIGNIFICANT CHANGE UP
FLUAV SUBTYP SPEC NAA+PROBE: SIGNIFICANT CHANGE UP
FLUBV RNA SPEC QL NAA+PROBE: SIGNIFICANT CHANGE UP
HADV DNA SPEC QL NAA+PROBE: SIGNIFICANT CHANGE UP
HCOV PNL SPEC NAA+PROBE: SIGNIFICANT CHANGE UP
HMPV RNA SPEC QL NAA+PROBE: SIGNIFICANT CHANGE UP
HPIV1 RNA SPEC QL NAA+PROBE: SIGNIFICANT CHANGE UP
HPIV2 RNA SPEC QL NAA+PROBE: SIGNIFICANT CHANGE UP
HPIV3 RNA SPEC QL NAA+PROBE: SIGNIFICANT CHANGE UP
HPIV4 RNA SPEC QL NAA+PROBE: SIGNIFICANT CHANGE UP
RAPID RVP RESULT: SIGNIFICANT CHANGE UP
RSV RNA SPEC QL NAA+PROBE: SIGNIFICANT CHANGE UP
RV+EV RNA SPEC QL NAA+PROBE: SIGNIFICANT CHANGE UP
SARS-COV-2 RNA SPEC QL NAA+PROBE: SIGNIFICANT CHANGE UP

## 2022-10-03 ENCOUNTER — EMERGENCY (EMERGENCY)
Facility: HOSPITAL | Age: 52
LOS: 1 days | Discharge: HOME | End: 2022-10-03
Attending: EMERGENCY MEDICINE
Payer: COMMERCIAL

## 2022-10-03 VITALS
RESPIRATION RATE: 16 BRPM | HEIGHT: 68 IN | OXYGEN SATURATION: 98 % | TEMPERATURE: 98 F | HEART RATE: 75 BPM | WEIGHT: 169.98 LBS | DIASTOLIC BLOOD PRESSURE: 92 MMHG | SYSTOLIC BLOOD PRESSURE: 142 MMHG

## 2022-10-03 LAB — SARS-COV-2 RNA SPEC QL NAA+PROBE: SIGNIFICANT CHANGE UP

## 2022-10-03 PROCEDURE — 99283 EMERGENCY DEPT VISIT LOW MDM: CPT

## 2022-10-03 PROCEDURE — 99053 MED SERV 10PM-8AM 24 HR FAC: CPT

## 2022-10-03 PROCEDURE — 87635 SARS-COV-2 COVID-19 AMP PRB: CPT

## 2022-10-03 NOTE — ED PROVIDER NOTE - NSFOLLOWUPCLINICS_GEN_ALL_ED_FT
[facility]  Internal Medicine  [address] [place] [address]  Phone: [phone]  Fax: [phone]

## 2022-10-03 NOTE — ED ADULT NURSE NOTE - PRO INTERPRETER NEED 2
"Chief Complaint   Patient presents with     Post-op Visit     Post-op or turb reduction     Height 1.778 m (5' 10\"), weight 95.7 kg (211 lb).    Batool Matias, EMT    "
Serbian

## 2022-10-03 NOTE — ED PROVIDER NOTE - PATIENT PORTAL LINK FT
You can access the FollowMyHealth Patient Portal offered by [facility] by registering at the following website: [email or web address]. By joining [email or web address]’s FollowMyHealth portal, you will also be able to view your health information using other applications (apps) compatible with our system.

## 2022-10-03 NOTE — ED PROVIDER NOTE - NSFOLLOWUPINSTRUCTIONS_ED_ALL_ED_FT
Return to ER immediately if feeling short of breath, having any pain, fever, coughing worsens, vomiting, weakness, any concerns. Take medications as instructed if they were prescribed.  See your primary doctor as soon as possible (1-2 days). If you need assistance with follow up appointment, you can contact our Care Coordinator at [phone].

## 2022-10-03 NOTE — ED PROVIDER NOTE - CLINICAL SUMMARY MEDICAL DECISION MAKING FREE TEXT BOX
Pt is nontoxic appearing, not hypoxic, no respiratory distress, not hypotensive.    Pt states compliant with antiretrovirals and VL is undetectable.   Pt is well appearing, has no new complaints and able to walk with normal gait. Pt is stable for discharge and follow up with medical doctor. Pt educated on care and need for follow up. Discussed anticipatory guidance and return precautions. Questions answered. I had a detailed discussion with the patient and/or guardian regarding the historical points, exam findings, and any diagnostic results supporting the discharge diagnosis.

## 2022-10-03 NOTE — ED ADULT TRIAGE NOTE - BSA (M2)
1.91 Cheek-To-Nose Interpolation Flap Text: A decision was made to reconstruct the defect utilizing an interpolation axial flap and a staged reconstruction.  A telfa template was made of the defect.  This telfa template was then used to outline the Cheek-To-Nose Interpolation flap.  The donor area for the pedicle flap was then injected with anesthesia.  The flap was excised through the skin and subcutaneous tissue down to the layer of the underlying musculature.  The interpolation flap was carefully excised within this deep plane to maintain its blood supply.  The edges of the donor site were undermined.   The donor site was closed in a primary fashion.  The pedicle was then rotated into position and sutured.  Once the tube was sutured into place, adequate blood supply was confirmed with blanching and refill.  The pedicle was then wrapped with xeroform gauze and dressed appropriately with a telfa and gauze bandage to ensure continued blood supply and protect the attached pedicle.

## 2022-10-03 NOTE — ED PROVIDER NOTE - OBJECTIVE STATEMENT
52-year-old male, patient with nonproductive coughing for 4 days.  No chest pain, no shortness of breath, no fever.  Pt requested COVID test.

## 2022-12-13 ENCOUNTER — EMERGENCY (EMERGENCY)
Facility: HOSPITAL | Age: 52
LOS: 1 days | Discharge: HOME | End: 2022-12-13
Attending: EMERGENCY MEDICINE
Payer: COMMERCIAL

## 2022-12-13 VITALS
WEIGHT: 174.17 LBS | SYSTOLIC BLOOD PRESSURE: 147 MMHG | RESPIRATION RATE: 19 BRPM | HEART RATE: 86 BPM | OXYGEN SATURATION: 98 % | DIASTOLIC BLOOD PRESSURE: 90 MMHG | TEMPERATURE: 99 F

## 2022-12-13 PROCEDURE — 87637 SARSCOV2&INF A&B&RSV AMP PRB: CPT

## 2022-12-13 PROCEDURE — 99283 EMERGENCY DEPT VISIT LOW MDM: CPT

## 2022-12-13 RX ORDER — FLUORESCEIN SODIUM 9 MG
1 STRIP OPHTHALMIC (EYE) ONCE
Refills: 0 | Status: COMPLETED | OUTPATIENT
Start: 2022-12-13 | End: 2022-12-13

## 2022-12-13 RX ORDER — TOBRAMYCIN AND DEXAMETHASONE 1; 3 MG/ML; MG/ML
2 SUSPENSION/ DROPS OPHTHALMIC
Refills: 0 | Status: DISCONTINUED | OUTPATIENT
Start: 2022-12-13 | End: 2022-12-17

## 2022-12-14 VITALS
TEMPERATURE: 98 F | RESPIRATION RATE: 18 BRPM | HEART RATE: 79 BPM | SYSTOLIC BLOOD PRESSURE: 155 MMHG | DIASTOLIC BLOOD PRESSURE: 87 MMHG | OXYGEN SATURATION: 97 %

## 2022-12-14 LAB
FLUAV AG NPH QL: SIGNIFICANT CHANGE UP
FLUBV AG NPH QL: SIGNIFICANT CHANGE UP
SARS-COV-2 RNA SPEC QL NAA+PROBE: SIGNIFICANT CHANGE UP

## 2022-12-14 RX ORDER — TOBRAMYCIN AND DEXAMETHASONE 1; 3 MG/ML; MG/ML
2 SUSPENSION/ DROPS OPHTHALMIC
Qty: 5 | Refills: 0
Start: 2022-12-14 | End: 2022-12-15

## 2022-12-14 RX ADMIN — TOBRAMYCIN AND DEXAMETHASONE 2 DROP(S): 1; 3 SUSPENSION/ DROPS OPHTHALMIC at 00:26

## 2022-12-14 RX ADMIN — Medication 1 APPLICATION(S): at 00:14

## 2022-12-14 NOTE — ED PROVIDER NOTE - CLINICAL SUMMARY MEDICAL DECISION MAKING FREE TEXT BOX
Rx Tobradex for conjunctivitis with ocular inflammation.  Pt is well appearing, has no new complaints and able to walk with normal gait. Pt is stable for discharge and follow up with medical doctor. Pt educated on care and need for follow up. Discussed anticipatory guidance and return precautions. Questions answered. I had a detailed discussion with the patient and/or guardian regarding the historical points, exam findings, and any diagnostic results supporting the discharge diagnosis.

## 2022-12-14 NOTE — ED PROVIDER NOTE - PATIENT PORTAL LINK FT
You can access the FollowMyHealth Patient Portal offered by Health system by registering at the following website: [email or web address]. By joining [employer]’s FollowMyHealth portal, you will also be able to view your health information using other applications (apps) compatible with our system.

## 2022-12-14 NOTE — ED PROVIDER NOTE - OBJECTIVE STATEMENT
52-year-old male chief complaint of bilateral eye redness with itchy irritation and discharge.  Patient noted right eye redness first 4 days ago and left eye progressed with similar symptoms 3 days ago.  Patient denies trauma or foreign body to eyes.  Patient denies any acute vision changes.  Patient denies headache, no fever.  Patient states he is compliant with antiretroviral therapy and CD4 count is high and viral load is undetectable.

## 2022-12-14 NOTE — ED PROVIDER NOTE - NSFOLLOWUPINSTRUCTIONS_ED_ALL_ED_FT
Bacterial Conjunctivitis, Adult      Bacterial conjunctivitis is an infection of the clear membrane that covers the white part of the eye and the inner surface of the eyelid (conjunctiva). When the blood vessels in the conjunctiva become inflamed, the eye becomes red or pink. The eye often feels irritated or itchy. Bacterial conjunctivitis spreads easily from person to person (is contagious). It also spreads easily from one eye to the other eye.      What are the causes?    This condition is caused by bacteria. You may get the infection if you come into close contact with:  •A person who is infected with the bacteria.      •Items that are contaminated with the bacteria, such as a face towel, contact lens solution, or eye makeup.        What increases the risk?    You are more likely to develop this condition if:  •You are exposed to other people who have the infection.      •You wear contact lenses.      •You have a sinus infection.      •You have had a recent eye injury or surgery.      •You have a weak body defense system (immune system).      •You have a medical condition that causes dry eyes.        What are the signs or symptoms?  A normal eye compared to an eye with bacterial conjunctivitis.    Symptoms of this condition include:  •Thick, yellowish discharge from the eye. This may turn into a crust on the eyelid overnight and cause your eyelids to stick together.      •Tearing or watery eyes.      •Itchy eyes.      •Burning feeling in your eyes.      •Eye redness.      •Swollen eyelids.      •Blurred vision.        How is this diagnosed?    This condition is diagnosed based on your symptoms and medical history. Your health care provider may also take a sample of discharge from your eye to find the cause of your infection.      How is this treated?  A person putting eye drops in an eye.   This condition may be treated with:  •Antibiotic eye drops or ointment to clear the infection more quickly and prevent the spread of infection to others.      •Antibiotic medicines taken by mouth (orally) to treat infections that do not respond to drops or ointments or that last longer than 10 days.      •Cool, wet cloths (cool compresses) placed on the eyes.      •Artificial tears applied 2–6 times a day.        Follow these instructions at home:    Medicines     •Take or apply your antibiotic medicine as told by your health care provider. Do not stop using the antibiotic, even if your condition improves, unless directed by your health care provider.      •Take or apply over-the-counter and prescription medicines only as told by your health care provider.      •Be very careful to avoid touching the edge of your eyelid with the eye-drop bottle or the ointment tube when you apply medicines to the affected eye. This will keep you from spreading the infection to your other eye or to other people.      Managing discomfort     •Gently wipe away any drainage from your eye with a warm, wet washcloth or a cotton ball.      •Apply a clean, cool compress to your eye for 10–20 minutes, 3–4 times a day.      General instructions     • Do not wear contact lenses until the inflammation is gone and your health care provider says it is safe to wear them again. Ask your health care provider how to sterilize or replace your contact lenses before you use them again. Wear glasses until you can resume wearing contact lenses.      •Avoid wearing eye makeup until the inflammation is gone. Throw away any old eye cosmetics that may be contaminated.      •Change or wash your pillowcase every day.      • Do not share towels or washcloths. This may spread the infection.      •Wash your hands often with soap and water for at least 20 seconds and especially before touching your face or eyes. Use paper towels to dry your hands.      •Avoid touching or rubbing your eyes.      • Do not drive or use heavy machinery if your vision is blurred.        Contact a health care provider if:    •You have a fever.      •Your symptoms do not get better after 10 days.        Get help right away if:    •You have a fever and your symptoms suddenly get worse.      •You have severe pain when you move your eye.      •You have facial pain, redness, or swelling.      •You have a sudden loss of vision.        Summary    •Bacterial conjunctivitis is an infection of the clear membrane that covers the white part of the eye and the inner surface of the eyelid (conjunctiva).      •Bacterial conjunctivitis spreads easily from eye to eye and from person to person (is contagious).      •Wash your hands often with soap and water for at least 20 seconds and especially before touching your face or eyes. Use paper towels to dry your hands.      •Take or apply your antibiotic medicine as told by your health care provider. Do not stop using the antibiotic even if your condition improves.      •Contact a health care provider if you have a fever or if your symptoms do not get better after 10 days. Get help right away if you have a sudden loss of vision.      This information is not intended to replace advice given to you by your health care provider. Make sure you discuss any questions you have with your health care provider.

## 2022-12-14 NOTE — ED PROVIDER NOTE - PHYSICAL EXAMINATION
No distress  Extra ocular muscles intact, pupils 3mm and reactive to light, no photophobia, no pain with eye movement, no hyphema   OD/OS 20/20  OD IOP 14 ,OS IOP 17  No increased fluorescein uptake.  B/l conjunctival injection with discharge at medial canthus.

## 2023-01-03 NOTE — ED PROVIDER NOTE - CONSTITUTIONAL NEGATIVE STATEMENT, MLM
Mom has strep and would like to know if this appointment today can be a video visit? Please call.    no fever and no chills.

## 2023-02-28 ENCOUNTER — EMERGENCY (EMERGENCY)
Facility: HOSPITAL | Age: 53
LOS: 1 days | Discharge: HOME | End: 2023-02-28
Attending: EMERGENCY MEDICINE
Payer: COMMERCIAL

## 2023-02-28 VITALS
OXYGEN SATURATION: 96 % | DIASTOLIC BLOOD PRESSURE: 89 MMHG | SYSTOLIC BLOOD PRESSURE: 156 MMHG | WEIGHT: 169.98 LBS | RESPIRATION RATE: 18 BRPM | HEART RATE: 66 BPM | TEMPERATURE: 98 F

## 2023-02-28 PROCEDURE — 99283 EMERGENCY DEPT VISIT LOW MDM: CPT

## 2023-02-28 PROCEDURE — 99284 EMERGENCY DEPT VISIT MOD MDM: CPT

## 2023-02-28 RX ORDER — DIAZEPAM 5 MG
1 TABLET ORAL
Qty: 5 | Refills: 0
Start: 2023-02-28 | End: 2023-03-04

## 2023-02-28 RX ORDER — DEXAMETHASONE 0.5 MG/5ML
10 ELIXIR ORAL ONCE
Refills: 0 | Status: COMPLETED | OUTPATIENT
Start: 2023-02-28 | End: 2023-02-28

## 2023-02-28 RX ORDER — LIDOCAINE 4 G/100G
1 CREAM TOPICAL
Qty: 14 | Refills: 0
Start: 2023-02-28 | End: 2023-03-13

## 2023-02-28 RX ORDER — IBUPROFEN 200 MG
1 TABLET ORAL
Qty: 30 | Refills: 0
Start: 2023-02-28

## 2023-02-28 RX ORDER — ACETAMINOPHEN 500 MG
650 TABLET ORAL ONCE
Refills: 0 | Status: COMPLETED | OUTPATIENT
Start: 2023-02-28 | End: 2023-02-28

## 2023-02-28 RX ORDER — IBUPROFEN 200 MG
600 TABLET ORAL ONCE
Refills: 0 | Status: COMPLETED | OUTPATIENT
Start: 2023-02-28 | End: 2023-02-28

## 2023-02-28 RX ADMIN — Medication 600 MILLIGRAM(S): at 13:01

## 2023-02-28 RX ADMIN — Medication 10 MILLIGRAM(S): at 13:02

## 2023-02-28 RX ADMIN — Medication 650 MILLIGRAM(S): at 13:01

## 2023-02-28 NOTE — ED PROVIDER NOTE - PHYSICAL EXAMINATION
GENERAL: well appearing, no acute distress   HEAD: atraumatic   EYES: EOMI   ENT: moist oral mucosa   CARDIAC: regular rate  RESPIRATORY: no increased work of breathing   MUSCULOSKELETAL: no deformity; no lumbar ttp   NEUROLOGICAL: AAOx3, CN's II-XII intact, strength 5/5 bilateral UE and LE, sensation intact to light touch, finger to nose intact, steady gait  SKIN: no visible rash  PSYCHIATRIC: cooperative

## 2023-02-28 NOTE — ED PROVIDER NOTE - OBJECTIVE STATEMENT
53 yo M pmh of HIV  c/o R buttocks pain w/ radiation to R leg x 1 month, but worse x a few days. Works as transporter.  Denies fever, trauma, weakness, incontinence, other acute complaints

## 2023-02-28 NOTE — ED PROVIDER NOTE - PATIENT PORTAL LINK FT
You can access the FollowMyHealth Patient Portal offered by [facility] by registering at the following website: [email or web address]. By joining [employer]’s FollowMyHealth portal, you will also be able to view your health information using other applications (apps) compatible with our system.

## 2023-02-28 NOTE — ED PROVIDER NOTE - CLINICAL SUMMARY MEDICAL DECISION MAKING FREE TEXT BOX
53 yo M with symptoms of sciatica  No red flags/concerning features  Ambulatory  Dc w/ supportive care and PCP fu for possible PT, MRI prn, etc   Discussed indications for patient return to ED. Patient understood.

## 2023-03-22 ENCOUNTER — EMERGENCY (EMERGENCY)
Facility: HOSPITAL | Age: 53
LOS: 1 days | Discharge: HOME | End: 2023-03-22
Attending: STUDENT IN AN ORGANIZED HEALTH CARE EDUCATION/TRAINING PROGRAM
Payer: COMMERCIAL

## 2023-03-22 VITALS
DIASTOLIC BLOOD PRESSURE: 84 MMHG | WEIGHT: 173.94 LBS | HEART RATE: 65 BPM | RESPIRATION RATE: 17 BRPM | HEIGHT: 68 IN | SYSTOLIC BLOOD PRESSURE: 132 MMHG | TEMPERATURE: 98 F | OXYGEN SATURATION: 96 %

## 2023-03-22 PROCEDURE — 99284 EMERGENCY DEPT VISIT MOD MDM: CPT

## 2023-03-22 PROCEDURE — 99283 EMERGENCY DEPT VISIT LOW MDM: CPT

## 2023-03-22 RX ORDER — LIDOCAINE 4 G/100G
1 CREAM TOPICAL ONCE
Refills: 0 | Status: COMPLETED | OUTPATIENT
Start: 2023-03-22 | End: 2023-03-22

## 2023-03-22 RX ORDER — DIAZEPAM 5 MG
2 TABLET ORAL ONCE
Refills: 0 | Status: DISCONTINUED | OUTPATIENT
Start: 2023-03-22 | End: 2023-03-22

## 2023-03-22 RX ORDER — ACETAMINOPHEN 500 MG
2 TABLET ORAL
Qty: 40 | Refills: 0
Start: 2023-03-22

## 2023-03-22 RX ORDER — LIDOCAINE 4 G/100G
1 CREAM TOPICAL
Qty: 7 | Refills: 0
Start: 2023-03-22

## 2023-03-22 RX ORDER — ACETAMINOPHEN 500 MG
650 TABLET ORAL ONCE
Refills: 0 | Status: COMPLETED | OUTPATIENT
Start: 2023-03-22 | End: 2023-03-22

## 2023-03-22 RX ORDER — DIAZEPAM 5 MG
1 TABLET ORAL
Qty: 10 | Refills: 0
Start: 2023-03-22

## 2023-03-22 RX ORDER — IBUPROFEN 200 MG
1 TABLET ORAL
Qty: 20 | Refills: 0
Start: 2023-03-22

## 2023-03-22 RX ADMIN — Medication 2 MILLIGRAM(S): at 13:01

## 2023-03-22 RX ADMIN — Medication 500 MILLIGRAM(S): at 13:01

## 2023-03-22 RX ADMIN — Medication 650 MILLIGRAM(S): at 13:01

## 2023-03-22 RX ADMIN — LIDOCAINE 1 PATCH: 4 CREAM TOPICAL at 13:00

## 2023-03-22 NOTE — ED PROVIDER NOTE - PHYSICAL EXAMINATION
Positive right straight leg test. Niacinamide Pregnancy And Lactation Text: These medications are considered safe during pregnancy.

## 2023-03-22 NOTE — ED ADULT TRIAGE NOTE - NS ED NURSE DIRECT TO ROOM YN
[FreeTextEntry1] : costochondritis - mobic course. Possible adverse effects discussed with pt.\par migraines - refill imitrex prn Yes

## 2023-03-22 NOTE — ED PROVIDER NOTE - OBJECTIVE STATEMENT
52 year old male presenting for right sided back pain radiating to the right leg. Patient endorses similar pain to his sciatica. No numbness, weakness, urinary retention/incontinence, trauma, fall, or drug use.

## 2023-03-22 NOTE — ED PROVIDER NOTE - PATIENT PORTAL LINK FT
You can access the FollowMyHealth Patient Portal offered by [facility] by registering at the following website: [email or web address]. By joining [employer]’s FollowMyHealth portal, you will also be able to view your health information using other applications (apps) compatible with our system.

## 2023-03-22 NOTE — ED PROVIDER NOTE - CLINICAL SUMMARY MEDICAL DECISION MAKING FREE TEXT BOX
Patient presenting with back pain. no redflag, no sign of caude. exam consistent with sciatica. history of similar pain in the past. will treat pain, reassess. patient state he has pmd f.u tomorrow.

## 2023-03-30 ENCOUNTER — EMERGENCY (EMERGENCY)
Facility: HOSPITAL | Age: 53
LOS: 1 days | Discharge: HOME | End: 2023-03-30
Attending: STUDENT IN AN ORGANIZED HEALTH CARE EDUCATION/TRAINING PROGRAM
Payer: COMMERCIAL

## 2023-03-30 VITALS — HEIGHT: 68 IN

## 2023-03-30 PROCEDURE — 99282 EMERGENCY DEPT VISIT SF MDM: CPT

## 2023-03-30 PROCEDURE — 99284 EMERGENCY DEPT VISIT MOD MDM: CPT

## 2023-03-30 NOTE — ED ADULT TRIAGE NOTE - CHIEF COMPLAINT QUOTE
Alison Nelson , I was not involved in the care of this patient and am only entering information to back log for downtime. please see paper chart as the mandatory fields in the disposition section may not be accurate.  need pain medication for Rt leg pain

## 2023-03-31 NOTE — DOWNTIME INTERRUPTION NOTE - WHICH MANUAL FORMS INITIATED?
Henry Black, PGY-3, MD: I was not involved in the care of this patient and am only entering information to back log for downtime. Regarding Emergency Department care during this visit/admission, please see paper chart for isolation and medical management details, as the mandatory fields in the disposition section may not be accurate.

## 2023-06-01 ENCOUNTER — EMERGENCY (EMERGENCY)
Facility: HOSPITAL | Age: 53
LOS: 1 days | Discharge: HOME | End: 2023-06-01
Attending: EMERGENCY MEDICINE
Payer: COMMERCIAL

## 2023-06-01 VITALS
WEIGHT: 164.02 LBS | OXYGEN SATURATION: 95 % | SYSTOLIC BLOOD PRESSURE: 153 MMHG | DIASTOLIC BLOOD PRESSURE: 93 MMHG | TEMPERATURE: 98 F | RESPIRATION RATE: 18 BRPM | HEART RATE: 64 BPM

## 2023-06-01 PROCEDURE — 99283 EMERGENCY DEPT VISIT LOW MDM: CPT

## 2023-06-01 PROCEDURE — 99284 EMERGENCY DEPT VISIT MOD MDM: CPT

## 2023-06-01 RX ORDER — METHOCARBAMOL 500 MG/1
2 TABLET, FILM COATED ORAL
Qty: 40 | Refills: 0
Start: 2023-06-01 | End: 2023-06-10

## 2023-06-01 RX ORDER — IBUPROFEN 200 MG
1 TABLET ORAL
Qty: 30 | Refills: 0
Start: 2023-06-01 | End: 2023-06-10

## 2023-06-01 RX ORDER — DIAZEPAM 5 MG
2 TABLET ORAL ONCE
Refills: 0 | Status: DISCONTINUED | OUTPATIENT
Start: 2023-06-01 | End: 2023-06-01

## 2023-06-01 RX ORDER — GABAPENTIN 400 MG/1
300 CAPSULE ORAL ONCE
Refills: 0 | Status: COMPLETED | OUTPATIENT
Start: 2023-06-01 | End: 2023-06-01

## 2023-06-01 RX ORDER — GABAPENTIN 400 MG/1
1 CAPSULE ORAL
Qty: 90 | Refills: 0
Start: 2023-06-01 | End: 2023-06-30

## 2023-06-01 RX ORDER — IBUPROFEN 200 MG
600 TABLET ORAL ONCE
Refills: 0 | Status: COMPLETED | OUTPATIENT
Start: 2023-06-01 | End: 2023-06-01

## 2023-06-01 RX ADMIN — GABAPENTIN 300 MILLIGRAM(S): 400 CAPSULE ORAL at 16:04

## 2023-06-01 RX ADMIN — Medication 600 MILLIGRAM(S): at 16:34

## 2023-06-01 RX ADMIN — Medication 2 MILLIGRAM(S): at 16:04

## 2023-06-01 RX ADMIN — Medication 600 MILLIGRAM(S): at 16:04

## 2023-06-01 NOTE — ED PROVIDER NOTE - PHYSICAL EXAMINATION
Tenderness to lumbar area. Tenderness to buttock area.   Sensation and strength intact.   Peripheral pluses are equal and intact.

## 2023-06-01 NOTE — ED ADULT NURSE NOTE - OBJECTIVE STATEMENT
States he has on and off right lower back pain radiating to right leg worse since yesterday.Dnies fall

## 2023-06-01 NOTE — ED ADULT NURSE NOTE - NSFALLUNIVINTERV_ED_ALL_ED
Bed/Stretcher in lowest position, wheels locked, appropriate side rails in place/Call bell, personal items and telephone in reach/Instruct patient to call for assistance before getting out of bed/chair/stretcher/Non-slip footwear applied when patient is off stretcher/[place] to call system/Physically safe environment - no spills, clutter or unnecessary equipment/Purposeful proactive rounding/Room/bathroom lighting operational, light cord in reach

## 2023-06-01 NOTE — ED PROVIDER NOTE - PATIENT PORTAL LINK FT
You can access the FollowMyHealth Patient Portal offered by [facility] by registering at the following website: [email or web address]. By joining [employer]’s FollowMyHealth portal, you will also be able to view your health information using other applications (apps) compatible with our system.

## 2023-06-12 ENCOUNTER — EMERGENCY (EMERGENCY)
Facility: HOSPITAL | Age: 53
LOS: 1 days | Discharge: HOME | End: 2023-06-12
Payer: COMMERCIAL

## 2023-06-12 VITALS
HEART RATE: 82 BPM | WEIGHT: 173.5 LBS | DIASTOLIC BLOOD PRESSURE: 95 MMHG | SYSTOLIC BLOOD PRESSURE: 136 MMHG | TEMPERATURE: 98 F | RESPIRATION RATE: 18 BRPM | HEIGHT: 68 IN | OXYGEN SATURATION: 95 %

## 2023-06-12 PROCEDURE — 72131 CT LUMBAR SPINE W/O DYE: CPT | Mod: MA

## 2023-06-12 PROCEDURE — 99284 EMERGENCY DEPT VISIT MOD MDM: CPT

## 2023-06-12 PROCEDURE — 72131 CT LUMBAR SPINE W/O DYE: CPT | Mod: 26,MA

## 2023-06-12 PROCEDURE — 99284 EMERGENCY DEPT VISIT MOD MDM: CPT | Mod: 25

## 2023-06-12 NOTE — ED ADULT NURSE NOTE - OBJECTIVE STATEMENT
States he has right lower back pain radiating to right leg down to knee x7 months .States he needs CT Scan.

## 2023-06-12 NOTE — ED PROVIDER NOTE - MUSCULOSKELETAL, MLM
Lower bilateral strength intact and 5/5. Spine appears normal, range of motion is not limited, no muscle or joint tenderness

## 2023-06-12 NOTE — ED PROVIDER NOTE - PATIENT PORTAL LINK FT
You can access the FollowMyHealth Patient Portal offered by [facility] by registering at the following website: [email or web address]. By joining [employer]’s FollowMyHealth portal, you will also be able to view your health information using other applications (apps) compatible with our system.

## 2023-06-12 NOTE — ED PROVIDER NOTE - DISPOSITION TYPE
DISCHARGE Carac Pregnancy And Lactation Text: This medication is Pregnancy Category X and contraindicated in pregnancy and in women who may become pregnant. It is unknown if this medication is excreted in breast milk.

## 2023-06-12 NOTE — ED PROVIDER NOTE - OBJECTIVE STATEMENT
52 year old male with PMHx of right sided sciatica is presenting for request for CT scan. Pt has a pain management specialist and will meet this Friday. They wanted pt to get a CT of his lumbar spine prior to appointment. As per pt, the pain is similar to his usual pain with no new onset of worsening pain, numbness, tingling, focal weakness, saddle anesthesia, urinary/bowel incontinence, fever, or other symptoms. Pt is already taking Neurontin, ibuprofen, and Robaxin for the pain .

## 2023-06-12 NOTE — ED PROVIDER NOTE - CLINICAL SUMMARY MEDICAL DECISION MAKING FREE TEXT BOX
52 year old male with history of chronic sciatica is presenting for request for CT scan of lumbar spine. Pt is requested by his pain management specialist to get a CT prior to appointment this Friday. No new or worsening symptoms from baseline. 52 year old male with history of chronic sciatica is presenting for request for CT scan of lumbar spine. Pt is requested by his pain management specialist to get a CT prior to appointment this Friday. No new or worsening symptoms from baseline.    Discussed CT results with patient.  No acute findings.  Patient will follow-up as per appointment with pain specialist this week.

## 2023-06-12 NOTE — ED PROVIDER NOTE - NSFOLLOWUPINSTRUCTIONS_ED_ALL_ED_FT
Follow up with the pain specialist this week.  If you experience any new or worsening symptoms or if you are concerned you can always come back to the emergency for a re-evaluation.  If there were any prescriptions given to you during the visit today take them as prescribed. If you have any questions you can ask the pharmacist.

## 2023-06-12 NOTE — ED PROVIDER NOTE - HISTORY ATTESTATION, MLM
I have reviewed and confirmed nurses' notes... Topical Clindamycin Counseling: Patient counseled that this medication may cause skin irritation or allergic reactions.  In the event of skin irritation, the patient was advised to reduce the amount of the drug applied or use it less frequently.   The patient verbalized understanding of the proper use and possible adverse effects of clindamycin.  All of the patient's questions and concerns were addressed.

## 2023-06-12 NOTE — ED ADULT NURSE NOTE - NSFALLUNIVINTERV_ED_ALL_ED
Bed/Stretcher in lowest position, wheels locked, appropriate side rails in place/Call bell, personal items and telephone in reach/Instruct patient to call for assistance before getting out of bed/chair/stretcher/Non-slip footwear applied when patient is off stretcher/[place] to call system/Physically safe environment - no spills, clutter or unnecessary equipment/Purposeful proactive rounding/Room/bathroom lighting operational, light cord in reach

## 2023-06-12 NOTE — ED PROVIDER NOTE - NS ED ATTENDING NAME FT
Last OV 3/31/22    Last fill 3/23/22 #90 with 1     Next OV 9/28/22    Last Labs 9/16/22    
Dr JEFFRY Vanegas

## 2023-07-02 ENCOUNTER — EMERGENCY (EMERGENCY)
Facility: HOSPITAL | Age: 53
LOS: 1 days | Discharge: HOME | End: 2023-07-02
Attending: EMERGENCY MEDICINE
Payer: COMMERCIAL

## 2023-07-02 VITALS
OXYGEN SATURATION: 97 % | DIASTOLIC BLOOD PRESSURE: 91 MMHG | RESPIRATION RATE: 18 BRPM | HEART RATE: 77 BPM | SYSTOLIC BLOOD PRESSURE: 136 MMHG | TEMPERATURE: 98 F | WEIGHT: 164.91 LBS | HEIGHT: 68 IN

## 2023-07-02 PROCEDURE — 99283 EMERGENCY DEPT VISIT LOW MDM: CPT

## 2023-07-02 PROCEDURE — 99282 EMERGENCY DEPT VISIT SF MDM: CPT

## 2023-07-02 NOTE — ED PROVIDER NOTE - OBJECTIVE STATEMENT
52-year-old male, history of chronic right-sided sciatica, presents for evaluation with request for work note.  Reports that has chronic right-sided sciatica and was recently evaluated by pain management specialist and had an MRI done few days ago.  Waiting for results and follow-up reports that today the pain is worse.  Denies any new symptoms such as numbness, tingling, saddle anesthesia, focal weakness, urinary/bowel incontinence, difficulty walking, fever or any other complaints.

## 2023-07-02 NOTE — ED PROVIDER NOTE - ATTENDING APP SHARED VISIT CONTRIBUTION OF CARE
I was physically present for the E/M service provided. I agree with above history, physical, and plan which I have reviewed and edited where appropriate. I was physically present for the key portions of the service provided.    cr: pt with chronic back pain no red flags. states unable to come to work today. here for work note. advise to see employee health

## 2023-07-02 NOTE — ED ADULT NURSE NOTE - PRIMARY CARE PROVIDER
Post-Anesthesia Evaluation and Assessment Patient: Michell Branham MRN: [id number]  SSN: [id number] YOB: 1952  Age: 74 y.o. Sex: female Cardiovascular Function/Vital Signs Visit Vitals  /75  Pulse (!) 57  Temp 36.4 °C (97.5 °F)  Resp 17  Ht 5' 5\" (1.651 m)  Wt 70.3 kg (154 lb 14.4 oz)  SpO2 92%  BMI 25.78 kg/m2 Patient is status post general anesthesia for Procedure(s): RIGHT BREAST CAPSULECTOMY RIGHT BREAST IMPLANT INSERTION LEFT BREAST IMPLANT REMOVED AND NO NEW IMPLANT INSERTED  
 CYST EXCISION RIGHT SHOULDER. Nausea/Vomiting: None Postoperative hydration reviewed and adequate. Pain: 
Pain Scale 1: Numeric (0 - 10) (08/01/18 0920) Pain Intensity 1: 1 (08/01/18 1021) Managed Neurological Status:  
Neuro (WDL): Within Defined Limits (08/01/18 0615) Neuro Neurologic State: Drowsy (08/01/18 1021) Orientation Level: Oriented X4 (08/01/18 1021) Cognition: Follows commands (08/01/18 1021) Speech: Clear (08/01/18 1021) LUE Motor Response: Purposeful (08/01/18 1021) LLE Motor Response: Purposeful (08/01/18 1021) RUE Motor Response: Purposeful (08/01/18 1021) RLE Motor Response: Purposeful (08/01/18 1021) At baseline Mental Status and Level of Consciousness: Arousable Pulmonary Status:  
O2 Device: Room air (08/01/18 1021) Adequate oxygenation and airway patent Complications related to anesthesia: None Post-anesthesia assessment completed. No concerns. Doing well. Signed By: Jonny Lowry MD   
 August 1, 2018 unknown

## 2023-07-02 NOTE — ED ADULT NURSE NOTE - OBJECTIVE STATEMENT
States he has right States he has right buttocks  radiating to right leg for a few months .Ambulating with steady gait

## 2023-07-02 NOTE — ED ADULT NURSE NOTE - NSFALLUNIVINTERV_ED_ALL_ED
Bed/Stretcher in lowest position, wheels locked, appropriate side rails in place/Call bell, personal items and telephone in reach/Instruct patient to call for assistance before getting out of bed/chair/stretcher/Non-slip footwear applied when patient is off stretcher/[place] to call system/Physically safe environment - no spills, clutter or unnecessary equipment/Purposeful proactive rounding/Room/bathroom lighting operational, light cord in reach

## 2023-07-02 NOTE — ED PROVIDER NOTE - CLINICAL SUMMARY MEDICAL DECISION MAKING FREE TEXT BOX
52-year-old male, presents for request of work note.  Reports today pain is worse than usual.  Only takes Aleve and Tylenol.  No focal no deficit or neuro symptoms.  Advised to follow-up with PMD/pain management this week.  The ones to have more than 1 day out of work should follow-up with occupational health for [facility] tomorrow.  Discussed again red flags to come back to the hospital.

## 2023-07-02 NOTE — ED PROVIDER NOTE - NSFOLLOWUPINSTRUCTIONS_ED_ALL_ED_FT
Follow up with the occupational health at [place] on Monday.  Follow up with the primary care doctor in 2-3 days.  If you experience any new or worsening symptoms or if you are concerned you can always come back to the emergency for a re-evaluation.

## 2023-07-02 NOTE — ED PROVIDER NOTE - PATIENT PORTAL LINK FT
You can access the FollowMyHealth Patient Portal offered by [facility] by registering at the following website: [email or web address]. By joining [employer]’s FollowMyHealth portal, you will also be able to view your health information using other applications (apps) compatible with our system.

## 2023-07-03 PROBLEM — M54.30 SCIATICA, UNSPECIFIED SIDE: Chronic | Status: ACTIVE | Noted: 2023-06-12

## 2023-08-28 ENCOUNTER — EMERGENCY (EMERGENCY)
Facility: HOSPITAL | Age: 53
LOS: 1 days | Discharge: HOME | End: 2023-08-28
Attending: EMERGENCY MEDICINE
Payer: COMMERCIAL

## 2023-08-28 VITALS
HEIGHT: 68 IN | SYSTOLIC BLOOD PRESSURE: 141 MMHG | DIASTOLIC BLOOD PRESSURE: 97 MMHG | HEART RATE: 68 BPM | RESPIRATION RATE: 18 BRPM | TEMPERATURE: 98 F | WEIGHT: 169.98 LBS | OXYGEN SATURATION: 96 %

## 2023-08-28 LAB
FLUAV AG NPH QL: SIGNIFICANT CHANGE UP
FLUBV AG NPH QL: SIGNIFICANT CHANGE UP
SARS-COV-2 RNA SPEC QL NAA+PROBE: DETECTED

## 2023-08-28 PROCEDURE — 87637 SARSCOV2&INF A&B&RSV AMP PRB: CPT

## 2023-08-28 PROCEDURE — 99284 EMERGENCY DEPT VISIT MOD MDM: CPT

## 2023-08-28 PROCEDURE — 99283 EMERGENCY DEPT VISIT LOW MDM: CPT

## 2023-08-28 RX ORDER — NIRMATRELVIR AND RITONAVIR 150-100 MG
1 KIT ORAL
Qty: 1 | Refills: 0
Start: 2023-08-28 | End: 2023-09-01

## 2023-08-28 NOTE — ED PROVIDER NOTE - CLINICAL SUMMARY MEDICAL DECISION MAKING FREE TEXT BOX
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    Vitals stable.    Nontoxic appearing, n/v intact.  Airway intact, no respiratory distress, no hypoxia.  Patient already vaccinated for COVID    Plan to obtain:    -We will test for COVID.    COVID testing pending at time of discharge.    Paxlovid pack sent to pharmacy and patient instructed to call for results or check on application for results in the morning and if COVID testing is positive then to start taking the Paxlovid pack.    Patient stable for further care in outpatient setting. No indication for inpatient admission at this time. Patient advised regarding symptomatic & supportive care and symptoms to prompt ED return. Strict return precautions provided.

## 2023-08-28 NOTE — ED PROVIDER NOTE - OBJECTIVE STATEMENT
52 male with hx of HIV & sciatica.   Pt presenting to the ED reporting cough & congestion for the past few days.  + COVID exposure.  Patient is already vaccinated for COVID.

## 2023-08-28 NOTE — ED PROVIDER NOTE - PATIENT PORTAL LINK FT
You can access the FollowMyHealth Patient Portal offered by [facility] by registering at the following website: [email or web address]. By joining [employer]’s FollowMyHealth portal, you will also be able to view your health information using other applications (apps) compatible with our system.

## 2023-08-28 NOTE — ED PROVIDER NOTE - NSFOLLOWUPCLINICS_GEN_ALL_ED_FT
[facility]  Internal Medicine  [address] [place] [address]  Phone: [phone]  Fax: [phone]  Follow Up Time: 1-3 Days

## 2023-08-28 NOTE — ED PROVIDER NOTE - PHYSICAL EXAMINATION
Gen:  Awake, alert, NAD, WDWN, NCAT, non-toxic appearing.   Eyes:  PERRL, EOMI, no icterus, normal lids/lashes, normal conjunctivae.  ENT:  External inspection normal, pink/moist membranes.   CV:  S1S2, regular rate and rhythm, no murmur/gallops/rubs, no JVD, 2+ pulses b/l, no edema/cords/homans, warm/well-perfused.  Resp:  Normal respiratory rate/effort, no respiratory distress, normal voice, speaking full sentences, lungs clear to auscultation b/l, no wheezing/rales/rhonchi, no retractions, no stridor.  Abd:  Soft abdomen, no tender/distended/guarding/rebound, no pulsatile mass, no CVA tender.   Musculoskeletal:  N/V intact, FROM all 4 extremities, normal motor tone, stable gait.   Neck:  FROM neck, supple, trachea midline, no meningismus.   Skin:  Color normal for race, warm and dry, no rash.  Neuro:  Oriented x3, CN 2-12 intact (grossly), normal motor (grossly), normal sensory (grossly), normal gait. GCS 15  Psych:  Attention normal. Affect normal. Behavior normal. Judgment normal.

## 2023-08-28 NOTE — ED PROVIDER NOTE - NSFOLLOWUPINSTRUCTIONS_ED_ALL_ED_FT
Please follow up with your PMD or Medicine Clinic in 2-3 days.  Return to the ER for worsening or concerning symptoms.   Quarantine at home for 5-10 days after the start of symptoms. Isolate from any family members you live with and encourage them to obtain COVID testing.   Take Ibuprofen (Advil or Motrin) 600mg every 6 hours as needed for pain or fever with food.  Take Acetaminophen (Tylenol) 650mg every 6 hours as needed for pain or fever.  Call for results of your COVID testing tomorrow or check on [employer]. If COVID test is positive then fill your prescription for Paxlovid and take as instruction.

## 2023-08-28 NOTE — ED PROVIDER NOTE - NS ED ROS FT
Constitutional: (-) fever (-) chills  HENT: (+) congestion (+) rhinorrhea (-) sore throat  Eyes: (-) pain, (-) redness  Respiratory: (+) cough (-) shortness of breath (-) wheezing (-) stridor    Cardiovascular: (-) chest pain (-) palpitations (-) leg swelling  Gastrointestinal: (-) abdominal pain (-) blood in stool (no melena/hematochezia) (-) diarrhea (-) vomiting  Genitourinary: (-) dysuria (-) hematuria  Musculoskeletal: (-) gait problem (-) joint swelling (-) myalgias  Skin: (-) color change (-) rash  Neurological: (-) weakness (-) numbness (-) headaches  Psychiatric/Behavioral: (-) confusion

## 2023-08-29 ENCOUNTER — TRANSCRIPTION ENCOUNTER (OUTPATIENT)
Age: 53
End: 2023-08-29

## 2023-09-26 NOTE — ED ADULT NURSE NOTE - BREATHING, MLM
GREG patient.  Last seen by GREG 3-. She was sent reminder letters on 5/22/17 and 8/23/2017.  Her last refill of ocp was given by Dr. Chin. Reception:  Please let her know that she would need to be seen in office by gyn before we can give refills.  She can try her PCP or Dr Chin if she has seen them more recently.     Spontaneous, unlabored and symmetrical Epidermal Autograft Text: The defect edges were debeveled with a #15 scalpel blade.  Given the location of the defect, shape of the defect and the proximity to free margins an epidermal autograft was deemed most appropriate.  Using a sterile surgical marker, the primary defect shape was transferred to the donor site. The epidermal graft was then harvested.  The skin graft was then placed in the primary defect and oriented appropriately.

## 2023-10-12 NOTE — ED PROVIDER NOTE - IV ALTEPLASE DOOR HIDDEN
Julianna Saeed   is seen today for a follow-up visit.     Pt doing well today some rare diarrhea.  CAD stent 1/23 Dr Seay LAD and potential RCA stent in future likely after 1 year and then ok for holding direct acting antiplatelet meds for f/u colon for multiple polyps as well as right should surgery?  no further chest pain or shortness of breath and followed by Dr mackey anemia H/H 12.9/36.9 MCV 92.5. Liver enzymes normal. 4/23 CT retained stool, atherosclerosis Aorta  show

## 2023-10-31 PROBLEM — Z00.00 ENCOUNTER FOR PREVENTIVE HEALTH EXAMINATION: Status: ACTIVE | Noted: 2023-10-31

## 2023-11-07 ENCOUNTER — EMERGENCY (EMERGENCY)
Facility: HOSPITAL | Age: 53
LOS: 1 days | Discharge: HOME | End: 2023-11-07
Attending: EMERGENCY MEDICINE
Payer: COMMERCIAL

## 2023-11-07 VITALS
DIASTOLIC BLOOD PRESSURE: 97 MMHG | RESPIRATION RATE: 18 BRPM | SYSTOLIC BLOOD PRESSURE: 155 MMHG | HEIGHT: 68 IN | TEMPERATURE: 98 F | WEIGHT: 171.96 LBS | OXYGEN SATURATION: 96 % | HEART RATE: 85 BPM

## 2023-11-07 VITALS
OXYGEN SATURATION: 97 % | DIASTOLIC BLOOD PRESSURE: 89 MMHG | SYSTOLIC BLOOD PRESSURE: 142 MMHG | HEART RATE: 82 BPM | TEMPERATURE: 98 F | RESPIRATION RATE: 18 BRPM

## 2023-11-07 LAB
ALBUMIN SERPL ELPH-MCNC: 3.6 G/DL — SIGNIFICANT CHANGE UP (ref 3.5–5)
ALBUMIN SERPL ELPH-MCNC: 3.6 G/DL — SIGNIFICANT CHANGE UP (ref 3.5–5)
ALP SERPL-CCNC: 55 U/L — SIGNIFICANT CHANGE UP (ref 40–120)
ALP SERPL-CCNC: 55 U/L — SIGNIFICANT CHANGE UP (ref 40–120)
ALT FLD-CCNC: 30 U/L DA — SIGNIFICANT CHANGE UP (ref 10–60)
ALT FLD-CCNC: 30 U/L DA — SIGNIFICANT CHANGE UP (ref 10–60)
ANION GAP SERPL CALC-SCNC: 5 MMOL/L — SIGNIFICANT CHANGE UP (ref 5–17)
ANION GAP SERPL CALC-SCNC: 5 MMOL/L — SIGNIFICANT CHANGE UP (ref 5–17)
APTT BLD: 32.7 SEC — SIGNIFICANT CHANGE UP (ref 24.5–35.6)
APTT BLD: 32.7 SEC — SIGNIFICANT CHANGE UP (ref 24.5–35.6)
AST SERPL-CCNC: 14 U/L — SIGNIFICANT CHANGE UP (ref 10–40)
AST SERPL-CCNC: 14 U/L — SIGNIFICANT CHANGE UP (ref 10–40)
BASOPHILS # BLD AUTO: 0.04 K/UL — SIGNIFICANT CHANGE UP (ref 0–0.2)
BASOPHILS # BLD AUTO: 0.04 K/UL — SIGNIFICANT CHANGE UP (ref 0–0.2)
BASOPHILS NFR BLD AUTO: 0.4 % — SIGNIFICANT CHANGE UP (ref 0–2)
BASOPHILS NFR BLD AUTO: 0.4 % — SIGNIFICANT CHANGE UP (ref 0–2)
BILIRUB SERPL-MCNC: 0.9 MG/DL — SIGNIFICANT CHANGE UP (ref 0.2–1.2)
BILIRUB SERPL-MCNC: 0.9 MG/DL — SIGNIFICANT CHANGE UP (ref 0.2–1.2)
BUN SERPL-MCNC: 18 MG/DL — SIGNIFICANT CHANGE UP (ref 7–18)
BUN SERPL-MCNC: 18 MG/DL — SIGNIFICANT CHANGE UP (ref 7–18)
CALCIUM SERPL-MCNC: 9.2 MG/DL — SIGNIFICANT CHANGE UP (ref 8.4–10.5)
CALCIUM SERPL-MCNC: 9.2 MG/DL — SIGNIFICANT CHANGE UP (ref 8.4–10.5)
CHLORIDE SERPL-SCNC: 106 MMOL/L — SIGNIFICANT CHANGE UP (ref 96–108)
CHLORIDE SERPL-SCNC: 106 MMOL/L — SIGNIFICANT CHANGE UP (ref 96–108)
CO2 SERPL-SCNC: 30 MMOL/L — SIGNIFICANT CHANGE UP (ref 22–31)
CO2 SERPL-SCNC: 30 MMOL/L — SIGNIFICANT CHANGE UP (ref 22–31)
CREAT SERPL-MCNC: 1.12 MG/DL — SIGNIFICANT CHANGE UP (ref 0.5–1.3)
CREAT SERPL-MCNC: 1.12 MG/DL — SIGNIFICANT CHANGE UP (ref 0.5–1.3)
EGFR: 78 ML/MIN/1.73M2 — SIGNIFICANT CHANGE UP
EGFR: 78 ML/MIN/1.73M2 — SIGNIFICANT CHANGE UP
EOSINOPHIL # BLD AUTO: 0.4 K/UL — SIGNIFICANT CHANGE UP (ref 0–0.5)
EOSINOPHIL # BLD AUTO: 0.4 K/UL — SIGNIFICANT CHANGE UP (ref 0–0.5)
EOSINOPHIL NFR BLD AUTO: 4.1 % — SIGNIFICANT CHANGE UP (ref 0–6)
EOSINOPHIL NFR BLD AUTO: 4.1 % — SIGNIFICANT CHANGE UP (ref 0–6)
GLUCOSE SERPL-MCNC: 100 MG/DL — HIGH (ref 70–99)
GLUCOSE SERPL-MCNC: 100 MG/DL — HIGH (ref 70–99)
HCT VFR BLD CALC: 44.1 % — SIGNIFICANT CHANGE UP (ref 39–50)
HCT VFR BLD CALC: 44.1 % — SIGNIFICANT CHANGE UP (ref 39–50)
HGB BLD-MCNC: 15.4 G/DL — SIGNIFICANT CHANGE UP (ref 13–17)
HGB BLD-MCNC: 15.4 G/DL — SIGNIFICANT CHANGE UP (ref 13–17)
IMM GRANULOCYTES NFR BLD AUTO: 0.5 % — SIGNIFICANT CHANGE UP (ref 0–0.9)
IMM GRANULOCYTES NFR BLD AUTO: 0.5 % — SIGNIFICANT CHANGE UP (ref 0–0.9)
INR BLD: 1.03 RATIO — SIGNIFICANT CHANGE UP (ref 0.85–1.18)
INR BLD: 1.03 RATIO — SIGNIFICANT CHANGE UP (ref 0.85–1.18)
LIDOCAIN IGE QN: 39 U/L — SIGNIFICANT CHANGE UP (ref 13–75)
LIDOCAIN IGE QN: 39 U/L — SIGNIFICANT CHANGE UP (ref 13–75)
LYMPHOCYTES # BLD AUTO: 3.39 K/UL — HIGH (ref 1–3.3)
LYMPHOCYTES # BLD AUTO: 3.39 K/UL — HIGH (ref 1–3.3)
LYMPHOCYTES # BLD AUTO: 34.9 % — SIGNIFICANT CHANGE UP (ref 13–44)
LYMPHOCYTES # BLD AUTO: 34.9 % — SIGNIFICANT CHANGE UP (ref 13–44)
MAGNESIUM SERPL-MCNC: 2 MG/DL — SIGNIFICANT CHANGE UP (ref 1.6–2.6)
MAGNESIUM SERPL-MCNC: 2 MG/DL — SIGNIFICANT CHANGE UP (ref 1.6–2.6)
MCHC RBC-ENTMCNC: 33 PG — SIGNIFICANT CHANGE UP (ref 27–34)
MCHC RBC-ENTMCNC: 33 PG — SIGNIFICANT CHANGE UP (ref 27–34)
MCHC RBC-ENTMCNC: 34.9 GM/DL — SIGNIFICANT CHANGE UP (ref 32–36)
MCHC RBC-ENTMCNC: 34.9 GM/DL — SIGNIFICANT CHANGE UP (ref 32–36)
MCV RBC AUTO: 94.4 FL — SIGNIFICANT CHANGE UP (ref 80–100)
MCV RBC AUTO: 94.4 FL — SIGNIFICANT CHANGE UP (ref 80–100)
MONOCYTES # BLD AUTO: 0.84 K/UL — SIGNIFICANT CHANGE UP (ref 0–0.9)
MONOCYTES # BLD AUTO: 0.84 K/UL — SIGNIFICANT CHANGE UP (ref 0–0.9)
MONOCYTES NFR BLD AUTO: 8.6 % — SIGNIFICANT CHANGE UP (ref 2–14)
MONOCYTES NFR BLD AUTO: 8.6 % — SIGNIFICANT CHANGE UP (ref 2–14)
NEUTROPHILS # BLD AUTO: 5 K/UL — SIGNIFICANT CHANGE UP (ref 1.8–7.4)
NEUTROPHILS # BLD AUTO: 5 K/UL — SIGNIFICANT CHANGE UP (ref 1.8–7.4)
NEUTROPHILS NFR BLD AUTO: 51.5 % — SIGNIFICANT CHANGE UP (ref 43–77)
NEUTROPHILS NFR BLD AUTO: 51.5 % — SIGNIFICANT CHANGE UP (ref 43–77)
NRBC # BLD: 0 /100 WBCS — SIGNIFICANT CHANGE UP (ref 0–0)
NRBC # BLD: 0 /100 WBCS — SIGNIFICANT CHANGE UP (ref 0–0)
NT-PROBNP SERPL-SCNC: 5 PG/ML — SIGNIFICANT CHANGE UP (ref 0–125)
NT-PROBNP SERPL-SCNC: 5 PG/ML — SIGNIFICANT CHANGE UP (ref 0–125)
PLATELET # BLD AUTO: 269 K/UL — SIGNIFICANT CHANGE UP (ref 150–400)
PLATELET # BLD AUTO: 269 K/UL — SIGNIFICANT CHANGE UP (ref 150–400)
POTASSIUM SERPL-MCNC: 3.6 MMOL/L — SIGNIFICANT CHANGE UP (ref 3.5–5.3)
POTASSIUM SERPL-MCNC: 3.6 MMOL/L — SIGNIFICANT CHANGE UP (ref 3.5–5.3)
POTASSIUM SERPL-SCNC: 3.6 MMOL/L — SIGNIFICANT CHANGE UP (ref 3.5–5.3)
POTASSIUM SERPL-SCNC: 3.6 MMOL/L — SIGNIFICANT CHANGE UP (ref 3.5–5.3)
PROT SERPL-MCNC: 7.1 G/DL — SIGNIFICANT CHANGE UP (ref 6–8.3)
PROT SERPL-MCNC: 7.1 G/DL — SIGNIFICANT CHANGE UP (ref 6–8.3)
PROTHROM AB SERPL-ACNC: 11.7 SEC — SIGNIFICANT CHANGE UP (ref 9.5–13)
PROTHROM AB SERPL-ACNC: 11.7 SEC — SIGNIFICANT CHANGE UP (ref 9.5–13)
RBC # BLD: 4.67 M/UL — SIGNIFICANT CHANGE UP (ref 4.2–5.8)
RBC # BLD: 4.67 M/UL — SIGNIFICANT CHANGE UP (ref 4.2–5.8)
RBC # FLD: 12.6 % — SIGNIFICANT CHANGE UP (ref 10.3–14.5)
RBC # FLD: 12.6 % — SIGNIFICANT CHANGE UP (ref 10.3–14.5)
SODIUM SERPL-SCNC: 141 MMOL/L — SIGNIFICANT CHANGE UP (ref 135–145)
SODIUM SERPL-SCNC: 141 MMOL/L — SIGNIFICANT CHANGE UP (ref 135–145)
TROPONIN I, HIGH SENSITIVITY RESULT: 7.9 NG/L — SIGNIFICANT CHANGE UP
TROPONIN I, HIGH SENSITIVITY RESULT: 7.9 NG/L — SIGNIFICANT CHANGE UP
WBC # BLD: 9.72 K/UL — SIGNIFICANT CHANGE UP (ref 3.8–10.5)
WBC # BLD: 9.72 K/UL — SIGNIFICANT CHANGE UP (ref 3.8–10.5)
WBC # FLD AUTO: 9.72 K/UL — SIGNIFICANT CHANGE UP (ref 3.8–10.5)
WBC # FLD AUTO: 9.72 K/UL — SIGNIFICANT CHANGE UP (ref 3.8–10.5)

## 2023-11-07 PROCEDURE — 99284 EMERGENCY DEPT VISIT MOD MDM: CPT

## 2023-11-07 PROCEDURE — 71046 X-RAY EXAM CHEST 2 VIEWS: CPT

## 2023-11-07 PROCEDURE — 84484 ASSAY OF TROPONIN QUANT: CPT

## 2023-11-07 PROCEDURE — 83880 ASSAY OF NATRIURETIC PEPTIDE: CPT

## 2023-11-07 PROCEDURE — 99285 EMERGENCY DEPT VISIT HI MDM: CPT | Mod: 25

## 2023-11-07 PROCEDURE — 36415 COLL VENOUS BLD VENIPUNCTURE: CPT

## 2023-11-07 PROCEDURE — 93005 ELECTROCARDIOGRAM TRACING: CPT

## 2023-11-07 PROCEDURE — 96374 THER/PROPH/DIAG INJ IV PUSH: CPT

## 2023-11-07 PROCEDURE — 71046 X-RAY EXAM CHEST 2 VIEWS: CPT | Mod: 26

## 2023-11-07 PROCEDURE — 83690 ASSAY OF LIPASE: CPT

## 2023-11-07 PROCEDURE — 85025 COMPLETE CBC W/AUTO DIFF WBC: CPT

## 2023-11-07 PROCEDURE — 80053 COMPREHEN METABOLIC PANEL: CPT

## 2023-11-07 PROCEDURE — 85610 PROTHROMBIN TIME: CPT

## 2023-11-07 PROCEDURE — 99053 MED SERV 10PM-8AM 24 HR FAC: CPT

## 2023-11-07 PROCEDURE — 83735 ASSAY OF MAGNESIUM: CPT

## 2023-11-07 PROCEDURE — 85730 THROMBOPLASTIN TIME PARTIAL: CPT

## 2023-11-07 RX ORDER — KETOROLAC TROMETHAMINE 30 MG/ML
30 SYRINGE (ML) INJECTION ONCE
Refills: 0 | Status: DISCONTINUED | OUTPATIENT
Start: 2023-11-07 | End: 2023-11-07

## 2023-11-07 RX ADMIN — Medication 30 MILLIGRAM(S): at 05:52

## 2023-11-07 NOTE — ED ADULT NURSE NOTE - NSHOSCREENINGQ1_ED_ALL_ED
MEDICAL ONCOLOGY - ESTABLISHED PATIENT VISIT    Reason for visit: Pancreatic adenocarcinoma    Best Contact Phone Number(s): [phone] (home)      Cancer/Stage/TNM:    Cancer Staging   Malignant neoplasm of pancreas  Staging form: Exocrine Pancreas, AJCC 8th Edition  - Clinical stage from 4/13/2023: Stage IB (cT2, cN0, cM0) - Unsigned       Oncology History   Malignant neoplasm of pancreas   3/21/2023 Initial Diagnosis    Pancreatic adenocarcinoma     5/1/2023 -  Chemotherapy    Treatment Summary   Plan Name: OP PANC NAB-PACLITAXEL + GEMCITABINE Q4W  Treatment Goal: Palliative  Status: Active  Start Date: 5/1/2023  End Date: 3/14/2024 (Planned)  Provider: Jim Hill MD  Chemotherapy: gemcitabine (GEMZAR) 1,400 mg in sodium chloride 0.9% SolP 321.82 mL chemo infusion, 1,448 mg (100 % of original dose 800 mg/m2), Intravenous, Clinic/HOD 1 time, 1 of 12 cycles  Dose modification: 800 mg/m2 (original dose 800 mg/m2, Cycle 1, Reason: Other (see comments), Comment: poor PS)          Interim History:   80 y.o. female with Crohn's disease, p.afib, SSS s/p PPM, HTN, HLD who presents for hospital follow up. She received cycle 1 on 5/1/23. She was admitted 5/2-5/3 for abdominal pain and n/v after starting treatment. She was given IVF, IV pain medications and IV antiemetics for suspected pancreatitis. She was discharged home with . She was then admitted 5/3-5/7 for palpitations and anterior pressure like non-radiating chest pain. She was noted to be in a fib and was given IVF again. She then had an acute respiratory decompensation the next day with imaging/labs consistent with fluid overload. She was given diuretics and breathing treatments with improvement in symptoms.     She is feeling significantly better with more strength and energy since receiving IV iron (3 doses now).  She is having her normal bowel movements, occasional abdominal discomfort associated with gas.  Improved breathing. Wants to hold off on  cycle 2 until she has a port placed.    ECOG PS 2    ROS:   Review of Systems   Constitutional:  Positive for malaise/fatigue. Negative for chills, fever and weight loss.   HENT:  Negative for congestion and sore throat.    Eyes:  Negative for blurred vision.   Respiratory:  Negative for shortness of breath.    Cardiovascular:  Negative for chest pain, palpitations and leg swelling.   Gastrointestinal:  Negative for abdominal pain, blood in stool, constipation, diarrhea, nausea and vomiting.   Genitourinary:  Negative for dysuria.   Musculoskeletal:  Negative for back pain, falls and myalgias.   Skin:  Negative for itching and rash.   Neurological:  Positive for weakness (generalized). Negative for dizziness, tingling, tremors, sensory change and focal weakness.   Endo/Heme/Allergies:  Does not bruise/bleed easily.   Psychiatric/Behavioral:  Negative for depression, substance abuse and suicidal ideas. The patient is not nervous/anxious.      Past Medical History:   Past Medical History:   Diagnosis Date    Anticoagulant long-term use     Atrial fibrillation     Crohn disease diagnosed in her 20's    GERD (gastroesophageal reflux disease)     Hyperlipidemia     Hypertension     Pancreatic adenocarcinoma 3/21/2023    Thyroid disease     s/p I 131        Past Surgical History:   Past Surgical History:   Procedure Laterality Date    APPENDECTOMY      CARDIAC SURGERY  2014    pacemaker    COLONOSCOPY  ~2008    normal findings per patient report    ENDOSCOPIC ULTRASOUND OF UPPER GASTROINTESTINAL TRACT N/A 3/14/2023    Procedure: ULTRASOUND, UPPER GI TRACT, ENDOSCOPIC;  Surgeon: Andrei Swartz MD;  Location: [facility];  Service: Endoscopy;  Laterality: N/A;  Approval to hold Eliquis rec'd from Dr. Barbosa (see telephone encounter 3/3/23)-DS  [employer] AICD/PPM  3/3/23-Instructions via portal-DS    ERCP N/A 3/21/2023    Procedure: ERCP (ENDOSCOPIC RETROGRADE CHOLANGIOPANCREATOGRAPHY);  Surgeon: Celina Toney MD;   Location: [facility] (2ND FLR);  Service: Endoscopy;  Laterality: N/A;    ESOPHAGOGASTRODUODENOSCOPY N/A 2018    Procedure: EGD (ESOPHAGOGASTRODUODENOSCOPY);  Surgeon: Alondra Casiano MD;  Location: [facility];  Service: Endoscopy;  Laterality: N/A;    HYSTERECTOMY      INSERTION OF IMPLANTABLE CARDIOVERTER-DEFIBRILLATOR (ICD) GENERATOR WITH TWO EXISTING LEADS Left 5/10/2021    Procedure: INSERTION, PULSE GENERATOR WITH 2 EXISTING LEADS, ICD;  Surgeon: Bo Leone MD;  Location: [facility] CATH LAB;  Service: Cardiology;  Laterality: Left;    INSERTION OF PACEMAKER      REPLACEMENT OF PACEMAKER GENERATOR  05/10/2021    RETROGRADE PYELOGRAPHY Right 2020    Procedure: PYELOGRAM, RETROGRADE;  Surgeon: Jovan Kruse MD;  Location: [facility];  Service: Urology;  Laterality: Right;    SMALL INTESTINE SURGERY  in her 20's    for crohn's    TONSILLECTOMY      UPPER GASTROINTESTINAL ENDOSCOPY  ~    normal findings per patient report        Family History:   Family History   Problem Relation Age of Onset    Cancer Mother     Breast cancer Mother     Crohn's disease Other     Colon cancer Neg Hx     Colon polyps Neg Hx     Esophageal cancer Neg Hx     Stomach cancer Neg Hx     Ulcerative colitis Neg Hx         Social History:   Social History     Tobacco Use    Smoking status: Former     Types: Cigarettes     Quit date:      Years since quittin.4    Smokeless tobacco: Never   Substance Use Topics    Alcohol use: No        I have reviewed and updated the patient's past medical, surgical, family and social histories.    Allergies:   Review of patient's allergies indicates:   Allergen Reactions    Lisinopril Other (See Comments)     cough        Medications:   Current Outpatient Medications   Medication Sig Dispense Refill    acetaminophen (TYLENOL) 325 MG tablet Take 650 mg by mouth daily as needed (Headache).      alendronate (FOSAMAX) 70 MG tablet Take 1 tablet (70 mg total) by mouth every 7 days. 12  tablet 3    amLODIPine (NORVASC) 5 MG tablet Take 1 tablet (5 mg total) by mouth once daily. 30 tablet 11    atorvastatin (LIPITOR) 20 MG tablet Take 1 tablet (20 mg total) by mouth once daily. 90 tablet 3    colestipoL (COLESTID) 1 gram Tab Take 1 tablet (1 g total) by mouth 2 (two) times daily. 60 tablet 5    diphenoxylate-atropine 2.5-0.025 mg (LOMOTIL) 2.5-0.025 mg per tablet TAKE 1 TABLET BY MOUTH 4 TIMES DAILY AS NEEDED FOR DIARRHEA 60 tablet 0    ELIQUIS 5 mg Tab Take 1 tablet (5 mg total) by mouth 2 (two) times daily. 180 tablet 3    esomeprazole (NEXIUM) 40 MG capsule Take 1 capsule (40 mg total) by mouth once daily. 90 capsule 3    folic acid (FOLVITE) 1 MG tablet Take 1 tablet by mouth once daily 90 tablet 0    furosemide (LASIX) 40 MG tablet Take 1 tablet (40 mg total) by mouth daily as needed (For weight gain > 3 lb in one day, increasing leg swelling, or increasing SOB). 30 tablet 11    levothyroxine (SYNTHROID, LEVOTHROID) 175 MCG tablet Take 1 tablet by mouth once daily 30 tablet 5    LIDOcaine-prilocaine (EMLA) cream Apply topically as needed (place to port site 45-60 minutes prior to port access). (Patient not taking: Reported on 5/26/2023) 30 g 6    lipase-protease-amylase 24,000-76,000-120,000 units (CREON) 24,000-76,000 -120,000 unit capsule Take 2 capsules by mouth 3 (three) times daily with meals. 180 capsule 11    MELATONIN ORAL Take 1 tablet by mouth nightly as needed (Insomnia).      polyethylene glycol (GLYCOLAX) 17 gram/dose powder Use cap to measure 17 grams, mix in liquid and take by mouth once daily. (Patient not taking: Reported on 5/26/2023) 510 g 0    senna-docusate 8.6-50 mg (PERICOLACE) 8.6-50 mg per tablet Take 1 tablet by mouth 2 (two) times daily. (Patient not taking: Reported on 5/26/2023) 60 tablet 0    sotaloL (BETAPACE) 120 MG Tab Take 1 tablet (120 mg total) by mouth 2 (two) times daily. 180 tablet 3    sulfaSALAzine (AZULFIDINE) 500 mg Tab Take 1 tablet by mouth twice  daily 180 tablet 3    tolterodine (DETROL LA) 4 MG 24 hr capsule Take 1 capsule by mouth once daily 90 capsule 3     No current facility-administered medications for this visit.      Physical Exam:   BP (!) 145/67 (BP Location: Left arm, Patient Position: Sitting, BP Method: Large (Automatic))   Pulse 97   Temp 97.9 °F (36.6 °C) (Oral)   Resp 18   Ht 5' (1.524 m)   Wt 75.5 kg (166 lb 8.9 oz)   SpO2 96%   BMI 32.53 kg/m²      ECOG Performance status: 2       Physical Exam  Vitals reviewed.   Constitutional:       General: She is not in acute distress.     Appearance: She is not ill-appearing, toxic-appearing or diaphoretic.   HENT:      Head: Normocephalic and atraumatic.      Right Ear: External ear normal.      Left Ear: External ear normal.      Nose: Nose normal. No congestion.      Mouth/Throat:      Pharynx: Oropharynx is clear. No oropharyngeal exudate.   Eyes:      General: No scleral icterus.     Conjunctiva/sclera: Conjunctivae normal.      Pupils: Pupils are equal, round, and reactive to light.   Cardiovascular:      Rate and Rhythm: Normal rate and regular rhythm.      Heart sounds: No murmur heard.  Pulmonary:      Effort: Pulmonary effort is normal. No respiratory distress.      Breath sounds: Normal breath sounds. No wheezing.   Abdominal:      General: Abdomen is flat. Bowel sounds are normal. There is no distension.      Palpations: Abdomen is soft. There is no mass.      Tenderness: There is no abdominal tenderness.   Musculoskeletal:         General: No swelling or deformity.   Skin:     General: Skin is warm and dry.      Coloration: Skin is not jaundiced or pale.      Findings: Bruising (multiple IV sites) present. No erythema or rash.   Neurological:      Mental Status: She is alert and oriented to person, place, and time. Mental status is at baseline.      Gait: Gait abnormal (in wheelchair).       Labs:   Recent Results (from the past 48 hour(s))   CBC Oncology    Collection Time:  05/29/23  3:58 PM   Result Value Ref Range    WBC 5.15 3.90 - 12.70 K/uL    RBC 4.27 4.00 - 5.40 M/uL    Hemoglobin 12.0 12.0 - 16.0 g/dL    Hematocrit 38.5 37.0 - 48.5 %    MCV 90 82 - 98 fL    MCH 28.1 27.0 - 31.0 pg    MCHC 31.2 (L) 32.0 - 36.0 g/dL    RDW 25.2 (H) 11.5 - 14.5 %    Platelets 182 150 - 450 K/uL    MPV 11.0 9.2 - 12.9 fL    Gran # (ANC) 2.8 1.8 - 7.7 K/uL    Immature Grans (Abs) 0.02 0.00 - 0.04 K/uL   CMP    Collection Time: 05/29/23  3:58 PM   Result Value Ref Range    Sodium 140 136 - 145 mmol/L    Potassium 3.2 (L) 3.5 - 5.1 mmol/L    Chloride 107 95 - 110 mmol/L    CO2 21 (L) 23 - 29 mmol/L    Glucose 145 (H) 70 - 110 mg/dL    BUN 9 8 - 23 mg/dL    Creatinine 0.8 0.5 - 1.4 mg/dL    Calcium 9.2 8.7 - 10.5 mg/dL    Total Protein 6.8 6.0 - 8.4 g/dL    Albumin 3.0 (L) 3.5 - 5.2 g/dL    Total Bilirubin 0.4 0.1 - 1.0 mg/dL    Alkaline Phosphatase 96 55 - 135 U/L    AST 27 10 - 40 U/L    ALT 15 10 - 44 U/L    Anion Gap 12 8 - 16 mmol/L    eGFR >60.0 >60 mL/min/1.73 m^2   CANCER ANTIGEN 19-9    Collection Time: 05/29/23  3:58 PM   Result Value Ref Range    CA 19-9 11.9 0.0 - 40.0 U/mL      I have reviewed the pertinent labs from 5/29/23 which are notable for hgb 12.0, albumin 3.0.     Imaging:    I have personally reviewed the imaging which is notable for a pancreatic head mass evident on CT abdomen pelvis with no evidence of metastatic disease.     Path:   PANCREAS, HEAD MASS, EUS-FNB:   Adenocarcinoma   Mismatch Repair (MMR) Proteins:    MLH1 - Intact nuclear expression    MSH2 - Intact nuclear expression    MSH6 - Intact nuclear expression    PMS2 - Intact nuclear expression    Assessment:       1. 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 Immunodeficiency secondary to neoplasm    4. Essential hypertension, benign    5. Mixed hyperlipidemia    6. Aortic atherosclerosis    7. Paroxysmal atrial fibrillation    8. Microcytic anemia    9. Iron deficiency anemia  due to chronic blood loss    10. Exocrine pancreatic insufficiency    11. Crohn's disease of large intestine without complication         Plan:     #  Pancreatic adenocarcinoma.   80 y.o. F with Crohn's disease, p.afib, SSS s/p PPM, HTN, HLD, fatigue, presented with weight loss and jaundice and was found to have  pancreatic adenocarcinoma. She has early stage pancreatic cancer, and is considered surgically resectable. However, giving her age and multiple co-morbidities, surgery might not be feasible or safe.     We have discussed with her the diagnosis, stage, prognosis and treatment options. She understands that Surgery is the only curable option and she is unlikely to be sufficiently medically fit for it. We have discussed chemotherapy with a palliative intent to improve symptoms and prolong life.   She expressed understanding and she values QoL but was willing to try chemotherapy.     We have discussed treatment with first line therapy Gemcitabine + Abraxane.  She agreed and wished to proceed.     Received cycle 1 of biweekly gem/abraxane with dose reductions of 800 mg/m2 and 100 mg/m2 respectively on 5/1/23. Subsequently hospitalized x 2 (ED visit same day as first discharge).    She feels much improved and wants to try chemotherapy again.  She does want to delay starting cycle 2 until she has a port placed.  I will reach out to IR about getting this placed ASAP.    Will have her RTC in 1-2 weeks for cycle 2.    Saw palliative care on 5/12. Continue to f/u as directed.     # HTN   BP mildly elevated today.  On amlodipine 5 mg daily, coreg 6.25 mg bid     # HLD, Atherosclerosis   On Lipitor 20 mg   Follows with cardiology     # P. Afib   Rate controlled.  On Eliquis.  Notes feeling better since med adjustments during admission.   Continue to monitor.     #. Microcytic anemia, Iron def due to chronic blood loss  Unclear etiology but likely from chronic blood loss 2/2 diverticulosis or tumor invasion of small  bowel.  Iron studies consistent with iron deficiency.   Couldn't tolerate PO iron due to severe GI side effects (abdominal cramps and nausea)  Injectafer denied by insurance so will administer Venofer x 4-5 doses.  S/p 3 doses, scheduled for #4 on Saturday.     #Pancreatic insufficiency   Improved with Creon   On lomotil for diarrhea   Continue to monitor.     # Crohn's disease.   On Sulfasalazine  Follows with GI     Follow up: 2 weeks for cycle 2.    Patient is in agreement with the proposed treatment plan. All questions were answered to the patient's satisfaction. Pt knows to call clinic if anything is needed before the next clinic visit.    Jim Hill MD  Hematology/Oncology  [facility]        Med Onc Chart Routing      Follow up with physician 4 weeks. for gem/abraxane   Follow up with DOMINGO 2 weeks. for gem/abraxane   Infusion scheduling note    Injection scheduling note    Labs CBC, CMP and CA 19-9   Scheduling:  Preferred lab:  Lab interval: every 2 weeks     Imaging    Pharmacy appointment    Other referrals            No

## 2023-11-07 NOTE — ED PROVIDER NOTE - CLINICAL SUMMARY MEDICAL DECISION MAKING FREE TEXT BOX
52-year-old male presenting with atypical right-sided chest pain.  Normal heart and lung/HEENT exam.  Plan to check EKG, cardiac enzymes, CXR complaints of.  Patient was stable thank you.  And reassess

## 2023-11-07 NOTE — ED PROVIDER NOTE - NSFOLLOWUPINSTRUCTIONS_ED_ALL_ED_FT
Chest Pain    WHAT YOU NEED TO KNOW:    Chest pain can be caused by a range of conditions, from not serious to life-threatening. Chest pain can be a symptom of a digestive problem, such as acid reflux or a stomach ulcer. An anxiety attack or a strong emotion, such as anger, can also cause chest pain. Infection, inflammation, or a fracture in the bones or cartilage in your chest can cause pain or discomfort. Sometimes chest pain or pressure is caused by poor blood flow to your heart (angina). Chest pain may also be caused by life-threatening conditions such as a heart attack or blood clot in your lungs.    DISCHARGE INSTRUCTIONS:    Call your local emergency number (911 in the [place]) or have someone call if:    You have any of the following signs of a heart attack:  Squeezing, pressure, or pain in your chest    You may also have any of the following:  Discomfort or pain in your back, neck, jaw, stomach, or arm    Shortness of breath    Nausea or vomiting    Lightheadedness or a sudden cold sweat    Seek care immediately if:    You have chest discomfort that gets worse, even with medicine.    You cough or vomit blood.    Your bowel movements are black or bloody.    You cannot stop vomiting, or it hurts to swallow.  Call your doctor if:    You have questions or concerns about your condition or care.    Medicines:    Medicines may be given to treat the cause of your chest pain. Examples include pain medicine, anxiety medicine, or medicines to increase blood flow to your heart.    Do not take certain medicines without asking your healthcare provider first. These include NSAIDs, herbal or vitamin supplements, and hormones, such as estrogen or progestin.    Take your medicine as directed. Contact your healthcare provider if you think your medicine is not helping or if you have side effects. Tell your provider if you are allergic to any medicine. Keep a list of the medicines, vitamins, and herbs you take. Include the amounts, and when and why you take them. Bring the list or the pill bottles to follow-up visits. Carry your medicine list with you in case of an emergency.  Healthy living tips: If the cause of your chest pain is known, your healthcare provider will give you specific guidelines to follow. The following are general healthy guidelines:    Do not smoke. Nicotine and other chemicals in cigarettes and cigars can cause lung and heart damage. Ask your healthcare provider for information if you currently smoke and need help to quit. E-cigarettes or smokeless tobacco still contain nicotine. Talk to your healthcare provider before you use these products.    Choose a variety of healthy foods as often as possible. Include fresh, frozen, or canned fruits and vegetables. Also include low-fat dairy products, fish, chicken (without skin), and lean meats. Your healthcare provider or a dietitian can help you create meal plans. You may need to avoid certain foods or drinks if your pain is caused by a digestion problem.  Healthy Foods      Lower your sodium (salt) intake. Limit foods that are high in sodium, such as canned foods, salty snacks, and cold cuts. If you add salt when you cook food, do not add more at the table. Choose low-sodium canned foods as much as possible.        Drink plenty of water every day. Water helps your body to control temperature and blood pressure. Ask your healthcare provider how much water you should drink every day.    Ask about activity. Your healthcare provider will tell you which activities to limit or avoid. Ask when you can drive, return to work, and have sex. Ask about the best exercise plan for you.    Maintain a healthy weight. Ask your healthcare provider what a healthy weight is for you. Ask him or her to help you create a safe weight loss plan if you are overweight.    Ask about vaccines you may need. Your healthcare provider can tell you which vaccines you need, and when to get them. The following vaccines help prevent diseases that can become serious for a person with a heart condition:  The influenza (flu) vaccine is given each year. Get a flu vaccine as soon as recommended, usually in September or October.    The pneumonia vaccine is usually given every 5 years. Your healthcare provider may recommend the pneumonia vaccine if you are 65 or older.    COVID-19 vaccines are given to adults as a shot in 1 or 2 doses. Vaccination is recommended for all adults. A booster (additional) dose is also recommended for all adults. A second booster is recommended for all adults 50 or older and for immunocompromised adults 18 or older. The second booster is also recommended for adults who received the 1-dose vaccine for the first and booster doses. Your healthcare provider can tell you when to get one or both boosters.  Prevent Heart Disease   Follow up with your doctor within 72 hours, or as directed: You may need to return for more tests to find the cause of your chest pain. You may be referred to a specialist, such as a cardiologist or gastroenterologist. Write down your questions so you remember to ask them during your visits.

## 2023-11-07 NOTE — ED PROVIDER NOTE - OBJECTIVE STATEMENT
53-year-old male history of HIV on ART here for sharp right-sided chest pain radiating to right ear with generalized headache gradual onset while working.  Patient is ED transported.  Reports pain is worse with movement.  Denies any associated dizziness, nausea, shortness of breath.  No history of CAD.  No leg pain or swelling.

## 2023-11-07 NOTE — ED ADULT NURSE NOTE - NSFALLUNIVINTERV_ED_ALL_ED
Bed/Stretcher in lowest position, wheels locked, appropriate side rails in place/Call bell, personal items and telephone in reach/Instruct patient to call for assistance before getting out of bed/chair/stretcher/Non-slip footwear applied when patient is off stretcher/[place] to call system/Physically safe environment - no spills, clutter or unnecessary equipment/Purposeful proactive rounding/Room/bathroom lighting operational, light cord in reach

## 2023-11-07 NOTE — ED PROVIDER NOTE - PATIENT PORTAL LINK FT
You can access the FollowMyHealth Patient Portal offered by [facility] by registering at the following website: [email or web address]. By joining [facility]’s FollowMyHealth portal, you will also be able to view your health information using other applications (apps) compatible with our system.

## 2024-02-25 ENCOUNTER — EMERGENCY (EMERGENCY)
Facility: HOSPITAL | Age: 54
LOS: 1 days | Discharge: HOME | End: 2024-02-25
Attending: EMERGENCY MEDICINE
Payer: COMMERCIAL

## 2024-02-25 VITALS
RESPIRATION RATE: 17 BRPM | OXYGEN SATURATION: 96 % | DIASTOLIC BLOOD PRESSURE: 80 MMHG | SYSTOLIC BLOOD PRESSURE: 158 MMHG | WEIGHT: 169.98 LBS | TEMPERATURE: 98 F | HEIGHT: 67 IN | HEART RATE: 88 BPM

## 2024-02-25 PROCEDURE — 87637 SARSCOV2&INF A&B&RSV AMP PRB: CPT

## 2024-02-25 PROCEDURE — 99284 EMERGENCY DEPT VISIT MOD MDM: CPT

## 2024-02-25 PROCEDURE — 99283 EMERGENCY DEPT VISIT LOW MDM: CPT | Mod: 25

## 2024-02-25 PROCEDURE — 94640 AIRWAY INHALATION TREATMENT: CPT

## 2024-02-25 RX ORDER — DEXAMETHASONE 0.5 MG/5ML
8 ELIXIR ORAL ONCE
Refills: 0 | Status: COMPLETED | OUTPATIENT
Start: 2024-02-25 | End: 2024-02-25

## 2024-02-25 RX ORDER — ALBUTEROL 90 UG/1
1 AEROSOL, METERED ORAL ONCE
Refills: 0 | Status: COMPLETED | OUTPATIENT
Start: 2024-02-25 | End: 2024-02-25

## 2024-02-25 RX ADMIN — Medication 200 MILLIGRAM(S): at 11:28

## 2024-02-25 RX ADMIN — Medication 8 MILLIGRAM(S): at 11:29

## 2024-02-25 RX ADMIN — ALBUTEROL 1 PUFF(S): 90 AEROSOL, METERED ORAL at 11:29

## 2024-02-25 NOTE — ED PROVIDER NOTE - NSFOLLOWUPINSTRUCTIONS_ED_ALL_ED_FT
Upper Respiratory Infection, Adult  An upper respiratory infection (URI) is a common viral infection of the nose, throat, and upper air passages that lead to the lungs. The most common type of URI is the common cold. URIs usually get better on their own, without medical treatment.    What are the causes?  A URI is caused by a virus. You may catch a virus by:  Breathing in droplets from an infected person's cough or sneeze.  Touching something that has been exposed to the virus (is contaminated) and then touching your mouth, nose, or eyes.  What increases the risk?  You are more likely to get a URI if:  You are very young or very old.  You have close contact with others, such as at work, school, or a health care facility.  You smoke.  You have long-term (chronic) heart or lung disease.  You have a weakened disease-fighting system (immune system).  You have nasal allergies or asthma.  You are experiencing a lot of stress.  You have poor nutrition.  What are the signs or symptoms?  A URI usually involves some of the following symptoms:  Runny or stuffy (congested) nose.  Cough.  Sneezing.  Sore throat.  Headache.  Fatigue.  Fever.  Loss of appetite.  Pain in your forehead, behind your eyes, and over your cheekbones (sinus pain).  Muscle aches.  Redness or irritation of the eyes.  Pressure in the ears or face.  How is this diagnosed?  This condition may be diagnosed based on your medical history and symptoms, and a physical exam. Your health care provider may use a swab to take a mucus sample from your nose (nasal swab). This sample can be tested to determine what virus is causing the illness.    How is this treated?  URIs usually get better on their own within 7–10 days. Medicines cannot cure URIs, but your health care provider may recommend certain medicines to help relieve symptoms, such as:  Over-the-counter cold medicines.  Cough suppressants. Coughing is a type of defense against infection that helps to clear the respiratory system, so take these medicines only as recommended by your health care provider.  Fever-reducing medicines.  Follow these instructions at home:  Activity    Rest as needed.  If you have a fever, stay home from work or school until your fever is gone or until your health care provider says your URI cannot spread to other people (is no longer contagious). Your health care provider may have you wear a face mask to prevent your infection from spreading.  Relieving symptoms    Gargle with a mixture of salt and water 3–4 times a day or as needed. To make salt water, completely dissolve ½–1 tsp (3–6 g) of salt in 1 cup (237 mL) of warm water.  Use a cool-mist humidifier to add moisture to the air. This can help you breathe more easily.  Eating and drinking    A comparison of three sample cups showing dark yellow, yellow, and pale yellow urine.  Drink enough fluid to keep your urine pale yellow.  Eat soups and other clear broths.  General instructions    A sign showing that a person should not smoke.  Take over-the-counter and prescription medicines only as told by your health care provider. These include cold medicines, fever reducers, and cough suppressants.  Do not use any products that contain nicotine or tobacco. These products include cigarettes, chewing tobacco, and vaping devices, such as e-cigarettes. If you need help quitting, ask your health care provider.  Stay away from secondhand smoke.  Stay up to date on all immunizations, including the yearly (annual) flu vaccine.  Keep all follow-up visits. This is important.  How to prevent the spread of infection to others    Washing hands with soap and water.  URIs can be contagious. To prevent the infection from spreading:  Wash your hands with soap and water for at least 20 seconds. If soap and water are not available, use hand .  Avoid touching your mouth, face, eyes, or nose.  Cough or sneeze into a tissue or your sleeve or elbow instead of into your hand or into the air.  Contact a health care provider if:  You are getting worse instead of better.  You have a fever or chills.  Your mucus is brown or red.  You have yellow or brown discharge coming from your nose.  You have pain in your face, especially when you bend forward.  You have swollen neck glands.  You have pain while swallowing.  You have white areas in the back of your throat.  Get help right away if:  You have shortness of breath that gets worse.  You have severe or persistent:  Headache.  Ear pain.  Sinus pain.  Chest pain.  You have chronic lung disease along with any of the following:  Making high-pitched whistling sounds when you breathe, most often when you breathe out (wheezing).  Prolonged cough (more than 14 days).  Coughing up blood.  A change in your usual mucus.  You have a stiff neck.  You have changes in your:  Vision.  Hearing.  Thinking.  Mood.  These symptoms may be an emergency. Get help right away. Call 911.  Do not wait to see if the symptoms will go away.  Do not drive yourself to the hospital.  Summary  An upper respiratory infection (URI) is a common infection of the nose, throat, and upper air passages that lead to the lungs.  A URI is caused by a virus.  URIs usually get better on their own within 7–10 days.  Medicines cannot cure URIs, but your health care provider may recommend certain medicines to help relieve symptoms.  This information is not intended to replace advice given to you by your health care provider. Make sure you discuss any questions you have with your health care provider.    Document Revised: 07/20/2022 Document Reviewed: 07/20/2022  [employer] Patient Education © 2023 [employer]  [employer] logo  Terms and Conditions  Privacy Policy  Editorial Policy  All content on this site: Copyright © 2024 Elsevier, its licensors, and contributors. All rights are reserved, including those for text and data mining, AI training, and similar technologies. For all open access content, the Creative Commons licensing terms apply.  Cookies are used by this site. To decline or learn more, visit our Cookies page.

## 2024-02-25 NOTE — ED ADULT NURSE NOTE - BIRTH SEX
Post-Care Instructions: I reviewed with the patient in detail post-care instructions. Patient is to wear sunprotection, and avoid picking at any of the treated lesions. Pt may apply Vaseline to crusted or scabbing areas. Duration Of Freeze Thaw-Cycle (Seconds): 2 Render Note In Bullet Format When Appropriate: No Show Applicator Variable?: Yes Detail Level: Detailed Consent: The patient's consent was obtained including but not limited to risks of crusting, scabbing, blistering, scarring, darker or lighter pigmentary change, recurrence, incomplete removal and infection. Number Of Freeze-Thaw Cycles: 2 freeze-thaw cycles Male

## 2024-02-25 NOTE — ED ADULT NURSE NOTE - CAS ELECT INFOMATION PROVIDED
pt  discharge  home with  complete  dc   instruction given to  pt. ff  up  with  PMD./DC instructions

## 2024-02-25 NOTE — ED PROVIDER NOTE - PATIENT PORTAL LINK FT
You can access the FollowMyHealth Patient Portal offered by [facility] by registering at the following website: [email or web address]. By joining [employer]’s FollowMyHealth portal, you will also be able to view your health information using other applications (apps) compatible with our system.

## 2024-02-25 NOTE — ED PROVIDER NOTE - OBJECTIVE STATEMENT
53 year old male PMH HIV on HAART coming in with 4 days of cough, nasal congestion, sore throat. denies fever. denies all other complaints at this time.

## 2024-02-25 NOTE — ED ADULT TRIAGE NOTE - MODE OF ARRIVAL
Physical Therapy  Physical Therapy Treatment    Name: Dominique Che  : 1943  MRN: [id number]      Date of Service: 2022    Evaluating PT:  Irish Branham, PT, DPT [id number]    Room #:  9869/2974-U  Diagnosis:  Brain bleed ([address] [address] [I61.9]  PMHx/PSHx:    Past Medical History:   Diagnosis Date    Anxiety     Hyperlipidemia     Hypertension      Procedure/Surgery:  None  Precautions:  Falls, bed/chair alarm  Equipment Needs:  TBD    SUBJECTIVE:    Pt lives alone in a 1 story home with 3 stairs to enter and 2 rail. Pt ambulated with [employer] PTA. [facility] AT [facility] 3 hrs/day. OBJECTIVE:   Initial Evaluation  Date: 22 Treatment  22 Short Term/ Long Term   Goals   AM-PAC 6 Clicks  57/74    Was pt agreeable to Eval/treatment? Yes Yes    Does pt have pain? No c/o pain Yes \"entire body\" 2/10     Bed Mobility  Rolling: NT  Supine to sit: ModA with HOB elevated  Sit to supine: NT  Scooting: ModA  SBA   Transfers Sit to stand: [place]  Stand to sit: [place]  Stand pivot: [place] with [employer] Sit to stand: Min A from bed, Mod A from commode and arm chair  Stand to sit: [place]  Stand pivot: [place] with [employer] SBA with [employer]   Ambulation   20 feet with [place] with [employer] 40 feet x2 with [place] with [employer] >150 feet with SBA with [employer]   Stair negotiation: ascended and descended NT  >4 steps with 1 rail SBA   ROM BUE:  Defer to OT note  BLE:  WFL     Strength BUE:  Defer to OT note  BLE:  4/5 grossly  Increase by 1/3 MMT grade   Balance Sitting EOB:  [place]  Dynamic Standing:  [place] with [employer] Sitting EOB:  Min A  Dynamic Standing:  Min A with [employer] Sitting EOB:  Independent  Dynamic Standing:  SBA with [employer]     Pt is A & O x 3  Sensation:  No reported paresthesias  Edema:  None  Vitals:  WNLs    Patient education  Pt educated on safety    Patient response to education:   Pt verbalized understanding Pt demonstrated skill Pt requires further education in this area   x x x     ASSESSMENT:    Conditions Requiring Skilled Therapeutic Intervention:    [x]Decreased strength     []Decreased ROM  [x]Decreased functional mobility  [x]Decreased balance   [x]Decreased endurance   [x]Decreased posture  []Decreased sensation  []Decreased coordination   []Decreased vision  []Decreased safety awareness   [x]Increased pain       Comments:  RN reported pt was medically stable. Pt was EOB with OT upon this therapists arrival. She consented to treatment and OT collaboration. Pt was perseverating on pain of her body but would not rate greater than 2/10. Obvious anxiety was noted throughout as well as short term memory complications. Gait deficits persisting include: decreased kye, path deviation, and decreased foot clearance. External support for unsteadiness . Fatigue limited activity. Pt was left in chair with all needs met and call light in reach. Multiple stands performed to adjust chair, pad etc. Alarm on. RN notified. Treatment:  Patient practiced and was instructed in the following treatment:    Sitting EOB for >5 minutes for upright tolerance, postural awareness and BLE ROM  Transfer training - pt was given verbal and tactile cues to facilitate proper hand placement, technique and safety during sit to stand, stand to sit and stand pivot transfers as well as provided with physical assistance. STS x1 from bed  STS x2 from commode  STS x4 from arm chair  Gait training- pt was given verbal and tactile cues to facilitate safety, balance and use of WW during ambulation as well as provided with physical assistance. PHYSICAL THERAPY PLAN OF CARE:    Pt slowly progressing toward goals. POC to remain as previously noted.     Time in  1225  Time out  1255    Total Treatment Time  30 minutes     CPT codes:  [] Low Complexity PT evaluation 83225  [] Moderate Complexity PT evaluation 80681  [] High Complexity PT evaluation 90862  [] PT Re-evaluation 34784  [] Gait training 98901 - minutes  [] Manual therapy 22494 - minutes  [x] Therapeutic activities 24362 30 minutes  [] Therapeutic exercises 50601 - minutes  [] Neuromuscular reeducation 50924 - minutes     Emaline Tanja PT, DPT  [id number] Walk in

## 2024-02-25 NOTE — ED PROVIDER NOTE - PATIENT'S PREFERRED PRONOUN
Him/He Composite Graft Text: The defect edges were debeveled with a #15 scalpel blade.  Given the location of the defect, shape of the defect, the proximity to free margins and the fact the defect was full thickness a composite graft was deemed most appropriate.  The defect was outline and then transferred to the donor site.  A full thickness graft was then excised from the donor site. The graft was then placed in the primary defect, oriented appropriately and then sutured into place.  The secondary defect was then repaired using a primary closure.

## 2024-03-17 ENCOUNTER — EMERGENCY (EMERGENCY)
Facility: HOSPITAL | Age: 54
LOS: 1 days | Discharge: HOME | End: 2024-03-17
Attending: EMERGENCY MEDICINE
Payer: COMMERCIAL

## 2024-03-17 VITALS
HEART RATE: 84 BPM | SYSTOLIC BLOOD PRESSURE: 128 MMHG | TEMPERATURE: 98 F | DIASTOLIC BLOOD PRESSURE: 70 MMHG | RESPIRATION RATE: 18 BRPM | OXYGEN SATURATION: 100 %

## 2024-03-17 VITALS
HEIGHT: 67 IN | WEIGHT: 171.96 LBS | HEART RATE: 95 BPM | OXYGEN SATURATION: 99 % | TEMPERATURE: 99 F | RESPIRATION RATE: 17 BRPM | DIASTOLIC BLOOD PRESSURE: 75 MMHG | SYSTOLIC BLOOD PRESSURE: 134 MMHG

## 2024-03-17 LAB
ALBUMIN SERPL ELPH-MCNC: 4 G/DL — SIGNIFICANT CHANGE UP (ref 3.5–5)
ALP SERPL-CCNC: 62 U/L — SIGNIFICANT CHANGE UP (ref 40–120)
ALT FLD-CCNC: 26 U/L DA — SIGNIFICANT CHANGE UP (ref 10–60)
ANION GAP SERPL CALC-SCNC: 4 MMOL/L — LOW (ref 5–17)
ANION GAP SERPL CALC-SCNC: 5 MMOL/L — SIGNIFICANT CHANGE UP (ref 5–17)
AST SERPL-CCNC: 16 U/L — SIGNIFICANT CHANGE UP (ref 10–40)
BASOPHILS # BLD AUTO: 0.03 K/UL — SIGNIFICANT CHANGE UP (ref 0–0.2)
BASOPHILS NFR BLD AUTO: 0.2 % — SIGNIFICANT CHANGE UP (ref 0–2)
BILIRUB SERPL-MCNC: 1 MG/DL — SIGNIFICANT CHANGE UP (ref 0.2–1.2)
BUN SERPL-MCNC: 18 MG/DL — SIGNIFICANT CHANGE UP (ref 7–18)
BUN SERPL-MCNC: 19 MG/DL — HIGH (ref 7–18)
CALCIUM SERPL-MCNC: 8.4 MG/DL — SIGNIFICANT CHANGE UP (ref 8.4–10.5)
CALCIUM SERPL-MCNC: 9.6 MG/DL — SIGNIFICANT CHANGE UP (ref 8.4–10.5)
CHLORIDE SERPL-SCNC: 110 MMOL/L — HIGH (ref 96–108)
CHLORIDE SERPL-SCNC: 113 MMOL/L — HIGH (ref 96–108)
CO2 SERPL-SCNC: 24 MMOL/L — SIGNIFICANT CHANGE UP (ref 22–31)
CO2 SERPL-SCNC: 24 MMOL/L — SIGNIFICANT CHANGE UP (ref 22–31)
CREAT SERPL-MCNC: 1.22 MG/DL — SIGNIFICANT CHANGE UP (ref 0.5–1.3)
CREAT SERPL-MCNC: 1.64 MG/DL — HIGH (ref 0.5–1.3)
EGFR: 49 ML/MIN/1.73M2 — LOW
EGFR: 70 ML/MIN/1.73M2 — SIGNIFICANT CHANGE UP
EOSINOPHIL # BLD AUTO: 0.06 K/UL — SIGNIFICANT CHANGE UP (ref 0–0.5)
EOSINOPHIL NFR BLD AUTO: 0.4 % — SIGNIFICANT CHANGE UP (ref 0–6)
GLUCOSE SERPL-MCNC: 102 MG/DL — HIGH (ref 70–99)
GLUCOSE SERPL-MCNC: 142 MG/DL — HIGH (ref 70–99)
HCT VFR BLD CALC: 43.4 % — SIGNIFICANT CHANGE UP (ref 39–50)
HCT VFR BLD CALC: 49.8 % — SIGNIFICANT CHANGE UP (ref 39–50)
HGB BLD-MCNC: 15.1 G/DL — SIGNIFICANT CHANGE UP (ref 13–17)
HGB BLD-MCNC: 17.2 G/DL — HIGH (ref 13–17)
IMM GRANULOCYTES NFR BLD AUTO: 0.3 % — SIGNIFICANT CHANGE UP (ref 0–0.9)
LIDOCAIN IGE QN: 27 U/L — SIGNIFICANT CHANGE UP (ref 13–75)
LYMPHOCYTES # BLD AUTO: 15.3 % — SIGNIFICANT CHANGE UP (ref 13–44)
LYMPHOCYTES # BLD AUTO: 2.26 K/UL — SIGNIFICANT CHANGE UP (ref 1–3.3)
MAGNESIUM SERPL-MCNC: 1.9 MG/DL — SIGNIFICANT CHANGE UP (ref 1.6–2.6)
MAGNESIUM SERPL-MCNC: 2 MG/DL — SIGNIFICANT CHANGE UP (ref 1.6–2.6)
MCHC RBC-ENTMCNC: 32.5 PG — SIGNIFICANT CHANGE UP (ref 27–34)
MCHC RBC-ENTMCNC: 33.1 PG — SIGNIFICANT CHANGE UP (ref 27–34)
MCHC RBC-ENTMCNC: 34.5 GM/DL — SIGNIFICANT CHANGE UP (ref 32–36)
MCHC RBC-ENTMCNC: 34.8 GM/DL — SIGNIFICANT CHANGE UP (ref 32–36)
MCV RBC AUTO: 94 FL — SIGNIFICANT CHANGE UP (ref 80–100)
MCV RBC AUTO: 95.2 FL — SIGNIFICANT CHANGE UP (ref 80–100)
MONOCYTES # BLD AUTO: 1.01 K/UL — HIGH (ref 0–0.9)
MONOCYTES NFR BLD AUTO: 6.8 % — SIGNIFICANT CHANGE UP (ref 2–14)
NEUTROPHILS # BLD AUTO: 11.38 K/UL — HIGH (ref 1.8–7.4)
NEUTROPHILS NFR BLD AUTO: 77 % — SIGNIFICANT CHANGE UP (ref 43–77)
NRBC # BLD: 0 /100 WBCS — SIGNIFICANT CHANGE UP (ref 0–0)
NRBC # BLD: 0 /100 WBCS — SIGNIFICANT CHANGE UP (ref 0–0)
PHOSPHATE SERPL-MCNC: 0.8 MG/DL — CRITICAL LOW (ref 2.5–4.5)
PHOSPHATE SERPL-MCNC: 3.8 MG/DL — SIGNIFICANT CHANGE UP (ref 2.5–4.5)
PLATELET # BLD AUTO: 298 K/UL — SIGNIFICANT CHANGE UP (ref 150–400)
PLATELET # BLD AUTO: 388 K/UL — SIGNIFICANT CHANGE UP (ref 150–400)
POTASSIUM SERPL-MCNC: 4.5 MMOL/L — SIGNIFICANT CHANGE UP (ref 3.5–5.3)
POTASSIUM SERPL-MCNC: 4.6 MMOL/L — SIGNIFICANT CHANGE UP (ref 3.5–5.3)
POTASSIUM SERPL-SCNC: 4.5 MMOL/L — SIGNIFICANT CHANGE UP (ref 3.5–5.3)
POTASSIUM SERPL-SCNC: 4.6 MMOL/L — SIGNIFICANT CHANGE UP (ref 3.5–5.3)
PROT SERPL-MCNC: 7.9 G/DL — SIGNIFICANT CHANGE UP (ref 6–8.3)
RBC # BLD: 4.56 M/UL — SIGNIFICANT CHANGE UP (ref 4.2–5.8)
RBC # BLD: 5.3 M/UL — SIGNIFICANT CHANGE UP (ref 4.2–5.8)
RBC # FLD: 12.3 % — SIGNIFICANT CHANGE UP (ref 10.3–14.5)
RBC # FLD: 12.4 % — SIGNIFICANT CHANGE UP (ref 10.3–14.5)
SODIUM SERPL-SCNC: 139 MMOL/L — SIGNIFICANT CHANGE UP (ref 135–145)
SODIUM SERPL-SCNC: 141 MMOL/L — SIGNIFICANT CHANGE UP (ref 135–145)
WBC # BLD: 14.79 K/UL — HIGH (ref 3.8–10.5)
WBC # BLD: 15.41 K/UL — HIGH (ref 3.8–10.5)
WBC # FLD AUTO: 14.79 K/UL — HIGH (ref 3.8–10.5)
WBC # FLD AUTO: 15.41 K/UL — HIGH (ref 3.8–10.5)

## 2024-03-17 PROCEDURE — 96361 HYDRATE IV INFUSION ADD-ON: CPT

## 2024-03-17 PROCEDURE — 99284 EMERGENCY DEPT VISIT MOD MDM: CPT

## 2024-03-17 PROCEDURE — 96374 THER/PROPH/DIAG INJ IV PUSH: CPT

## 2024-03-17 PROCEDURE — 83735 ASSAY OF MAGNESIUM: CPT

## 2024-03-17 PROCEDURE — 36415 COLL VENOUS BLD VENIPUNCTURE: CPT

## 2024-03-17 PROCEDURE — 80048 BASIC METABOLIC PNL TOTAL CA: CPT

## 2024-03-17 PROCEDURE — 71046 X-RAY EXAM CHEST 2 VIEWS: CPT

## 2024-03-17 PROCEDURE — 84100 ASSAY OF PHOSPHORUS: CPT

## 2024-03-17 PROCEDURE — 70360 X-RAY EXAM OF NECK: CPT | Mod: 26

## 2024-03-17 PROCEDURE — 70360 X-RAY EXAM OF NECK: CPT

## 2024-03-17 PROCEDURE — 83690 ASSAY OF LIPASE: CPT

## 2024-03-17 PROCEDURE — 85025 COMPLETE CBC W/AUTO DIFF WBC: CPT

## 2024-03-17 PROCEDURE — 99284 EMERGENCY DEPT VISIT MOD MDM: CPT | Mod: 25

## 2024-03-17 PROCEDURE — 96375 TX/PRO/DX INJ NEW DRUG ADDON: CPT

## 2024-03-17 PROCEDURE — 71046 X-RAY EXAM CHEST 2 VIEWS: CPT | Mod: 26

## 2024-03-17 PROCEDURE — 80053 COMPREHEN METABOLIC PANEL: CPT

## 2024-03-17 PROCEDURE — 85027 COMPLETE CBC AUTOMATED: CPT

## 2024-03-17 RX ORDER — SODIUM,POTASSIUM PHOSPHATES 278-250MG
1 POWDER IN PACKET (EA) ORAL ONCE
Refills: 0 | Status: DISCONTINUED | OUTPATIENT
Start: 2024-03-17 | End: 2024-03-17

## 2024-03-17 RX ORDER — ONDANSETRON 8 MG/1
4 TABLET, FILM COATED ORAL ONCE
Refills: 0 | Status: COMPLETED | OUTPATIENT
Start: 2024-03-17 | End: 2024-03-17

## 2024-03-17 RX ORDER — GLUCAGON INJECTION, SOLUTION 0.5 MG/.1ML
1 INJECTION, SOLUTION SUBCUTANEOUS ONCE
Refills: 0 | Status: COMPLETED | OUTPATIENT
Start: 2024-03-17 | End: 2024-03-17

## 2024-03-17 RX ORDER — SODIUM,POTASSIUM PHOSPHATES 278-250MG
2 POWDER IN PACKET (EA) ORAL ONCE
Refills: 0 | Status: COMPLETED | OUTPATIENT
Start: 2024-03-17 | End: 2024-03-17

## 2024-03-17 RX ORDER — SODIUM CHLORIDE 9 MG/ML
1000 INJECTION INTRAMUSCULAR; INTRAVENOUS; SUBCUTANEOUS ONCE
Refills: 0 | Status: COMPLETED | OUTPATIENT
Start: 2024-03-17 | End: 2024-03-17

## 2024-03-17 RX ADMIN — Medication 2 PACKET(S): at 18:01

## 2024-03-17 RX ADMIN — SODIUM CHLORIDE 1000 MILLILITER(S): 9 INJECTION INTRAMUSCULAR; INTRAVENOUS; SUBCUTANEOUS at 17:15

## 2024-03-17 RX ADMIN — SODIUM CHLORIDE 2000 MILLILITER(S): 9 INJECTION INTRAMUSCULAR; INTRAVENOUS; SUBCUTANEOUS at 16:15

## 2024-03-17 RX ADMIN — ONDANSETRON 4 MILLIGRAM(S): 8 TABLET, FILM COATED ORAL at 16:14

## 2024-03-17 RX ADMIN — GLUCAGON INJECTION, SOLUTION 1 MILLIGRAM(S): 0.5 INJECTION, SOLUTION SUBCUTANEOUS at 16:15

## 2024-03-17 NOTE — ED PROVIDER NOTE - NS ED ROS FT
Constitutional: (-) fever (-) chills  HENT: (-) congestion (-) rhinorrhea (-) sore throat  Eyes: (-) pain (-) redness  Respiratory: (-) cough (-) shortness of breath (-) wheezing (-) stridor    Cardiovascular: (-) chest pain (-) palpitations (-) leg swelling  Gastrointestinal: (-) abdominal pain (-) blood in stool (no melena/hematochezia) (-) diarrhea (+) vomiting (+) FB sensation  Genitourinary: (-) dysuria (-) hematuria  Musculoskeletal: (-) gait problem (-) joint swelling (-) myalgias  Skin: (-) color change (-) rash  Neurological: (-) weakness (-) numbness (-) headaches  Psychiatric/Behavioral: (-) confusion

## 2024-03-17 NOTE — ED PROVIDER NOTE - PATIENT PORTAL LINK FT
You can access the FollowMyHealth Patient Portal offered by [facility] by registering at the following website: [email or web address]. By joining [employer]’s FollowMyHealth portal, you will also be able to view your health information using other applications (apps) compatible with our system.

## 2024-03-17 NOTE — ED PROVIDER NOTE - NSFOLLOWUPCLINICS_GEN_ALL_ED_FT
[facility]  Gastroenterology  [address] [place] [address]  Phone: [phone]  Fax: [phone]  Follow Up Time: 4-6 Days    [facility]  Internal Medicine  [address] [place] [address]  Phone: [phone]  Fax: [phone]  Follow Up Time: 1-3 Days

## 2024-03-17 NOTE — ED ADULT NURSE NOTE - SUICIDE SCREENING DEPRESSION
Patient is scheduled for a L side SIJ injection at [facility] on 4/20 at 1:00pm. Ok with Dr. Bae that patient drives home after.      Written instructions reviewed and sent home with patient. Patient verbalized understanding.      Negative

## 2024-03-17 NOTE — ED PROVIDER NOTE - OBJECTIVE STATEMENT
53 male with hx of HIV & sciatica.   Pt presenting to the ED reporting upper chest FB sensation after eating chicken and pork ~12p today. Felt a little better after vomiting but reporting persistent symptoms.

## 2024-03-17 NOTE — ED ADULT TRIAGE NOTE - CHIEF COMPLAINT QUOTE
pt c/o felt something stuck in the throat after ate chicken x this morning. Pt noted one episode of vomiting at triage, and states felt much better after vomiting.

## 2024-03-17 NOTE — ED ADULT NURSE NOTE - NSFALLUNIVINTERV_ED_ALL_ED
Bed/Stretcher in lowest position, wheels locked, appropriate side rails in place/Call bell, personal items and telephone in reach/Instruct patient to call for assistance before getting out of bed/chair/stretcher/Non-slip footwear applied when patient is off stretcher/[place] to call system/Physically safe environment - no spills, clutter or unnecessary equipment/Purposeful proactive rounding/Room/bathroom lighting operational, light cord in reach

## 2024-03-17 NOTE — ED PROVIDER NOTE - CLINICAL SUMMARY MEDICAL DECISION MAKING FREE TEXT BOX
53 male with hx of HIV & sciatica.   Pt presenting to the ED reporting upper chest FB sensation after eating chicken and pork ~12p today. Felt a little better after vomiting but reporting persistent symptoms.    Vitals stable.  Nontoxic appearing, n/v intact.  Airway intact, no respiratory distress, no hypoxia.  No abdominal or CVA tenderness.      Plan to obtain:    -Labs, XR, antiemetics as needed, IV fluids, trial of glucagon, observe/reassess    Lab values demonstrate no acute/emergent pathology.  My independent interpretation of the EKG: Sinus @ [], normal axis, normal intervals, normal ST/T  My independent interpretation of XR: [No consolidation/effusion/pntx]    [***]    [Patient advised regarding need for close outpatient follow up.  Patient stable for further care in outpatient setting. No indication for inpatient admission at this time. Patient advised regarding symptomatic & supportive care and symptoms to prompt ED return. Strict return precautions provided.]    [Patient requires inpatient admission for further care & stabilization. Care signed out to inpatient team.] 53 male with hx of HIV & sciatica.   Pt presenting to the ED reporting upper chest FB sensation after eating chicken and pork ~12p today. Felt a little better after vomiting but reporting persistent symptoms.    Vitals stable.  Nontoxic appearing, n/v intact.  Airway intact, no respiratory distress, no hypoxia.  No abdominal or CVA tenderness.      Plan to obtain:    -Labs, XR, antiemetics as needed, IV fluids, trial of glucagon, observe/reassess    Lab values w leukocytosis and mild decrease in Cr.  My independent interpretation of XR: No consolidation/effusion/pntx. No radio-oopaque FB  IV fluids & glucagon given w resolution of symptoms.   Patient tolerating PO intake in ED  Symptoms resolved on reassessment.  Patient advised regarding need for close outpatient follow up.  Patient stable for further care in outpatient setting. No indication for inpatient admission at this time. Patient advised regarding symptomatic & supportive care and symptoms to prompt ED return. Strict return precautions provided. 53 male with hx of HIV & sciatica.   Pt presenting to the ED reporting upper chest FB sensation after eating chicken and pork ~12p today. Felt a little better after vomiting but reporting persistent symptoms.    Vitals stable.  Nontoxic appearing, n/v intact.  Airway intact, no respiratory distress, no hypoxia.  No abdominal or CVA tenderness.      Plan to obtain:    -Labs, XR, antiemetics as needed, IV fluids, trial of glucagon, observe/reassess    Lab values w leukocytosis and mild decrease in Cr. Low Phos as well.  My independent interpretation of XR: No consolidation/effusion/pntx. No radio-oopaque FB  IV fluids & glucagon given w resolution of symptoms.   Patient tolerating PO intake in ED  Symptoms resolved on reassessment.  Electrolytes repleted and labs repeated w improvement in Cr and Phos but persistent leukocytosis. No symptoms of infection at this time.  Patient advised regarding need for close outpatient follow up.  Patient stable for further care in outpatient setting. No indication for inpatient admission at this time. Patient advised regarding symptomatic & supportive care and symptoms to prompt ED return. Strict return precautions provided.

## 2024-03-17 NOTE — ED PROVIDER NOTE - NSFOLLOWUPINSTRUCTIONS_ED_ALL_ED_FT
Please follow up with your PMD or Medicine Clinic in 2-3 days. Show your lab results to your PMD.  Follow up with GI in 1 week.  Return to the ER for worsening or concerning symptoms.  Drink plenty of fluids or an oral rehydration solution like Pedialyte, Gatorade, or Powerade.  Keep your diet simple until symptoms improve. Introduce foods as tolerated such as bread, toast, plain rice, boiled potatoes, boiled chicken, bananas, apple sauce, etc. Avoid dairy, spicy, greasy, or fatty foods. Avoid alcohol or tobacco.    - - - - - - - - - - - - -  Food Impaction    WHAT YOU NEED TO KNOW:    What is food impaction? Food impaction occurs when food (often meat or fish bones) becomes stuck in your esophagus. Food impaction can occur if your esophagus does not function normally. Food impaction may also happen if you do not have teeth or do not chew your food completely.    How is food impaction diagnosed and treated? Your healthcare provider will ask what your symptoms are, when they began, and what you recently ate. You may need an x-ray to help locate the food in your esophagus. You may need the following treatments:    Medicines may be given to relax your esophagus. This may help food pass into your stomach.    Endoscopy is a procedure in which a scope (thin, flexible tube with a light) is used to examine your upper gastrointestinal tract. Devices, such as forceps, are used during endoscopy to pull out the food. Your healthcare provider may gently push the food through your esophagus and into your stomach. Your provider may send tissue from your esophagus to a lab for tests.  How can I prevent another episode of food impaction?    Sit up while you eat. Raise the head of your bed if you need to eat in bed.    Make sure your dentures fit well. Poorly fitting dentures make it difficult to chew.    Chew your food completely. Proper chewing will help with swallowing.    Drink water with meals. Water will help move food down to your stomach.    Follow up with your healthcare provider. You may need tests to check your ability to swallow.  When should I seek immediate care?    You are unable to swallow your saliva.    Your breathing is noisy.    You have trouble breathing.    You have repeated vomiting or blood in your vomit.    You have pain in your chest or abdomen.  When should I contact my healthcare provider?    You feel you have something stuck in your throat.    You choke or vomit after eating.    You have questions or concerns about your condition or care.  CARE AGREEMENT:    You have the right to help plan your care. Learn about your health condition and how it may be treated. Discuss treatment options with your healthcare providers to decide what care you want to receive. You always have the right to refuse treatment.

## 2024-03-17 NOTE — ED ADULT NURSE NOTE - OBJECTIVE STATEMENT
AOX4 +ambulatory patient reports he was eating chicken and he feels like something is stuck on this throat, patient states he feels better when he vomits. no drooling no sob speaking in full sentences

## 2024-05-30 ENCOUNTER — EMERGENCY (EMERGENCY)
Facility: HOSPITAL | Age: 54
LOS: 1 days | Discharge: HOME | End: 2024-05-30
Attending: STUDENT IN AN ORGANIZED HEALTH CARE EDUCATION/TRAINING PROGRAM
Payer: COMMERCIAL

## 2024-05-30 VITALS
SYSTOLIC BLOOD PRESSURE: 169 MMHG | WEIGHT: 175.05 LBS | OXYGEN SATURATION: 95 % | HEART RATE: 68 BPM | TEMPERATURE: 98 F | RESPIRATION RATE: 18 BRPM | DIASTOLIC BLOOD PRESSURE: 101 MMHG

## 2024-05-30 PROCEDURE — 99053 MED SERV 10PM-8AM 24 HR FAC: CPT

## 2024-05-30 PROCEDURE — 99283 EMERGENCY DEPT VISIT LOW MDM: CPT

## 2024-05-30 PROCEDURE — 99282 EMERGENCY DEPT VISIT SF MDM: CPT

## 2024-05-30 RX ORDER — ACETAMINOPHEN 500 MG
975 TABLET ORAL ONCE
Refills: 0 | Status: COMPLETED | OUTPATIENT
Start: 2024-05-30 | End: 2024-05-30

## 2024-05-30 RX ADMIN — Medication 975 MILLIGRAM(S): at 03:49

## 2024-05-30 NOTE — ED ADULT NURSE NOTE - NSFALLOOBATTEMPT_ED_ALL_ED
"One and only is a 52 year old who is being evaluated via a billable video visit.      How would you like to obtain your AVS? MyChart  If the video visit is dropped, the invitation should be resent by: Text to cell phone: [phone]   Will anyone else be joining your video visit? No      Video Start Time: 0910    Assessment & Plan      Relatively short duration of insomnia.  Patient thinks she is used trazodone in the past and had good response to it.  Discussed behavioral interventions including removing screens from the bedroom and increasing exercise.    She is already on an SSRI so we need to careful about QT prolongation and serotonin symptoms.    I have recommended that she come in and get her thyroid checked as were bit behind on that.  Also check on basic metabolic panel and cholesterol since that due.    Problem List Items Addressed This Visit        Endocrine    Hypothyroidism    Relevant Orders    TSH    Lipid panel reflex to direct LDL Fasting    Basic metabolic panel  (Ca, Cl, CO2, Creat, Gluc, K, Na, BUN)      Other Visit Diagnoses     Insomnia, unspecified type    -  Primary    Relevant Medications    traZODone (DESYREL) 50 MG tablet    Visit for screening mammogram        Relevant Orders    MA SCREENING DIGITAL BILAT - Future  (s+30)            Tobacco Cessation:   reports that she has been smoking cigarettes. She has a 35.00 pack-year smoking history. She has never used smokeless tobacco.    BMI:   Estimated body mass index is 37.55 kg/m  as calculated from the following:    Height as of 9/20/21: 1.6 m (5' 3\").    Weight as of 9/20/21: 96.2 kg (212 lb).         Return in about 1 month (around 1/20/2022) for Follow up.    Sincere Barlow MD  [facility]    Nohelia Davis is a 52 year old who presents for the following health issues:    HPI   Patient with concerns of insomnia.  Has lasted about the last 5 to 6 days.  She has had this before.  Does not think pain is " implicated as baclofen has been controlling that recently.  She is having some difficulty with sleep initiation.  She does think she wakes up after 45 minutes and then has a lot of difficulty getting back to sleep.  She is tried some ZzzQuil and melatonin.  Has not felt that either of those were effective.    Thyroid has not been checked recently.  She confirms current dose of 250 mcg.  Does not think she has had palpitations, hot intolerance, diarrhea, or skin or hair changes.  She is not exercising much.  She does not think she is overly stressed.        Objective           Vitals:  No vitals were obtained today due to virtual visit.    Physical Exam   GENERAL: Healthy, alert and no distress  EYES: Eyes grossly normal to inspection.  No discharge or erythema, or obvious scleral/conjunctival abnormalities.  RESP: No audible wheeze, cough, or visible cyanosis.  No visible retractions or increased work of breathing.    SKIN: Visible skin clear. No significant rash, abnormal pigmentation or lesions.  NEURO: Cranial nerves grossly intact.  Mentation and speech appropriate for age.  PSYCH: Mentation appears normal, affect normal/bright, judgement and insight intact, normal speech and appearance well-groomed.        Video-Visit Details    Type of service:  Video Visit    Video End Time:0921    Originating Location (pt. Location): Home    Distant Location (provider location):  [facility]     Platform used for Video Visit: blueKiwi   No
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
## 2024-05-30 NOTE — ED PROVIDER NOTE - PATIENT PORTAL LINK FT
You can access the FollowMyHealth Patient Portal offered by [facility] by registering at the following website: [email or web address]. By joining [employer]’s FollowMyHealth portal, you will also be able to view your health information using other applications (apps) compatible with our system.

## 2024-05-30 NOTE — ED PROVIDER NOTE - OBJECTIVE STATEMENT
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 Has pain along his first metacarpal. No issues ranging his fingers/hand/wrist. No numbness or tingling. No other symptoms.

## 2024-05-30 NOTE — ED PROVIDER NOTE - NSFOLLOWUPINSTRUCTIONS_ED_ALL_ED_FT
You were seen in the emergency department for: hand pain  For your pain, please take Tylenol 1000mg every 6 hours as needed or Ibuprofen 600mg every 6 hours as needed - you can alternate every 3 hours as needed.   We recommend you follow up with: Orthopedics    Please return to the Emergency Department if you experience any of the following symptoms:   - Shortness of breath or trouble breathing  - Pressure, pain or tightness in the chest  - Face drooping, arm weakness or speech difficulty  - Persistence of severe vomiting  - Head injury or loss of consciousness  - Nonstop bleeding or an open wound    (1) Follow up with your primary care physician within the next 24-48 hours as discussed. In addition, we did not find evidence of a life threatening illness on your testing here today, but listed below are the specialists that will be necessary to see as an outpatient to continue the workup.  Please call the numbers listed below or [phone] to set up the necessary appointments.  (2) Take Tylenol (up to 1000mg or 1 g)  and/or Motrin (up to 600mg) up to every 6 hours as needed for pain.   (3) If you had an IV (intravenous) line placed, it was removed. Sometimes, after IV removal, that area can be tender for a few days; if it develops redness and swelling, those could be signs of infection; in which case, return to the Emergency Department for assessment.  (4) Please continue taking all of your home medications as directed.

## 2024-05-30 NOTE — ED ADULT NURSE NOTE - NSFALLUNIVINTERV_ED_ALL_ED
Bed/Stretcher in lowest position, wheels locked, appropriate side rails in place/Call bell, personal items and telephone in reach/Instruct patient to call for assistance before getting out of bed/chair/stretcher/Non-slip footwear applied when patient is off stretcher/[place] to call system/Physically safe environment - no spills, clutter or unnecessary equipment/Purposeful proactive rounding/Room/bathroom lighting operational, light cord in reach
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
## 2024-05-30 NOTE — ED ADULT NURSE NOTE - PATIENT'S PREFERRED PRONOUN
----- Message from Jaqueline Borges sent at 10/12/2018  1:22 PM CDT -----  Contact: mom Ana Kay [phone]  Mom called requesting a call back from the nurse to see if the doctor will give patient a few pills until her appt on Monday   Him/He

## 2024-05-30 NOTE — ED PROVIDER NOTE - NSFOLLOWUPCLINICS_GEN_ALL_ED_FT
[facility]  Orthopedics  [address] [place] [address]  Phone: [phone]  Fax: [phone]

## 2024-06-02 ENCOUNTER — NON-APPOINTMENT (OUTPATIENT)
Age: 54
End: 2024-06-02

## 2024-06-03 ENCOUNTER — APPOINTMENT (OUTPATIENT)
Age: 54
End: 2024-06-03
Payer: OTHER MISCELLANEOUS

## 2024-06-03 VITALS
DIASTOLIC BLOOD PRESSURE: 100 MMHG | WEIGHT: 175 LBS | BODY MASS INDEX: 26.52 KG/M2 | OXYGEN SATURATION: 95 % | SYSTOLIC BLOOD PRESSURE: 154 MMHG | HEART RATE: 83 BPM | HEIGHT: 68 IN

## 2024-06-03 DIAGNOSIS — S60.221A CONTUSION OF RIGHT HAND, INITIAL ENCOUNTER: ICD-10-CM

## 2024-06-03 PROCEDURE — 99203 OFFICE O/P NEW LOW 30 MIN: CPT
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
## 2024-06-03 NOTE — PHYSICAL EXAM
[de-identified] : Hand/wrist/finger (Right)  Inspection  Swelling: none  Ecchymosis: none  Scissoring: none    Palpation  Tenderness: Very mild Location: just proximal to the second MCP joint  Wrist Flexion  Normal.  Wrist Extension  Normal.  Wrist Radial Deviation  Normal.  Wrist Ulnar Deviation  Normal.   Wrist/Pinch/ Motor Strength  Wrist Extension: 5/5 Wrist Flexion: 5/5  : 5/5   Finger Flexion  Normal.  Finger Extension  Normal.  Sensory index  Normal   Special Tests   Finkelsteins: normal  Tinnels: normal  Phalens: normal  Pinch : normal

## 2024-06-03 NOTE — ADDENDUM
[FreeTextEntry1] : Ileana PALOMINO ATC, assisted with history and documentation for Dr. Coleman on this date 06/03/24.

## 2024-06-03 NOTE — HISTORY OF PRESENT ILLNESS
[de-identified] : JAZMYNJAYJAYGO MAGGI is a 52 y/o male who c/o 1/10 Rt hand pain. He stated that on Thursday (5/30/24), he was walking through an automatic door and hit his hand on the door. He noted taking tylenol and alleve which helped to relieve his pain. Denies numbness or tingling. He no longer has pain today and no workday was missed.

## 2024-06-14 NOTE — ED ADULT TRIAGE NOTE - AS HEIGHT TYPE
Pulmonary Progress Note    Date of Admission: 6/11/2024   LOS: 3 days       CC:  Chief Complaint   Patient presents with    Shortness of Breath     Pt states SOB started 2-3 days ago and has ankle swelling bilateral         Subjective:  Weaned to room air.  Shortness of breath improved.  Continues to have interesting thought processes.  Patient would like to know what is going on before doing test.  He seems to be perpetually agitated.           Assessment:          Plan:     This note may have been transcribed using Dragon Dictation software. Please disregard any translational errors.       Hospital Day: 3     Abnormal radiograph  Pneumomediastinum  No seen on chest X-ray.   Significant emphysema demonstrated with hx tobacco and cocaine use.    pending alpha 1.    Duoneb's, as needed   Prednisone 20 mg daily.  Discontinue    Mediastinal hilar adenopathy noted with reticulonodular opacities.    Agree with overall concern for fluid overload versus pneumonia.  Elevated BNP.      Echocardiogram completed. .    Procal continues to be low.  Discontinue antibiotic         Pulmonary nodules  Seen on CT chest.  Unclear significance.  Follow up in 3 months.         Left eye laceration   See nurses note.  Plans to follow up as out patient to remove stiches.       agitated   Personality disorder? Withdrawal?   Is very likely the patient has some psychiatric disorder that has not been diagnosed.  Continues to be agitated.  Patient will likely discharge versus sign out AMA.      CHF   with grade 2 diastolic dysfunction   Catheterization recommended but patient likely to refuse catheterization today.        Will sign off at this time.  Thank for the consultation.  Will do a 3-month follow-up to follow-up on pulmonary nodule.      Future Appointments   Date Time Provider Department Center   6/18/2024  2:00 PM Martin Triana MD [facility]   9/25/2024  2:40 PM Jason Willard MD [facility]            Data:     actual

## 2024-11-10 ENCOUNTER — EMERGENCY (EMERGENCY)
Facility: HOSPITAL | Age: 54
LOS: 1 days | Discharge: HOME | End: 2024-11-10
Attending: STUDENT IN AN ORGANIZED HEALTH CARE EDUCATION/TRAINING PROGRAM
Payer: COMMERCIAL

## 2024-11-10 VITALS
OXYGEN SATURATION: 98 % | DIASTOLIC BLOOD PRESSURE: 98 MMHG | SYSTOLIC BLOOD PRESSURE: 151 MMHG | HEART RATE: 88 BPM | TEMPERATURE: 98 F | HEIGHT: 67 IN | WEIGHT: 174.17 LBS | RESPIRATION RATE: 18 BRPM

## 2024-11-10 LAB
ANION GAP SERPL CALC-SCNC: 4 MMOL/L — LOW (ref 5–17)
BUN SERPL-MCNC: 25 MG/DL — HIGH (ref 7–18)
CALCIUM SERPL-MCNC: 8.8 MG/DL — SIGNIFICANT CHANGE UP (ref 8.4–10.5)
CHLORIDE SERPL-SCNC: 109 MMOL/L — HIGH (ref 96–108)
CO2 SERPL-SCNC: 27 MMOL/L — SIGNIFICANT CHANGE UP (ref 22–31)
CREAT SERPL-MCNC: 1.64 MG/DL — HIGH (ref 0.5–1.3)
EGFR: 49 ML/MIN/1.73M2 — LOW
FLUAV AG NPH QL: SIGNIFICANT CHANGE UP
FLUBV AG NPH QL: SIGNIFICANT CHANGE UP
GLUCOSE SERPL-MCNC: 97 MG/DL — SIGNIFICANT CHANGE UP (ref 70–99)
HCT VFR BLD CALC: 47.3 % — SIGNIFICANT CHANGE UP (ref 39–50)
HGB BLD-MCNC: 16.6 G/DL — SIGNIFICANT CHANGE UP (ref 13–17)
MCHC RBC-ENTMCNC: 33.7 PG — SIGNIFICANT CHANGE UP (ref 27–34)
MCHC RBC-ENTMCNC: 35.1 G/DL — SIGNIFICANT CHANGE UP (ref 32–36)
MCV RBC AUTO: 95.9 FL — SIGNIFICANT CHANGE UP (ref 80–100)
NRBC # BLD: 0 /100 WBCS — SIGNIFICANT CHANGE UP (ref 0–0)
PLATELET # BLD AUTO: 227 K/UL — SIGNIFICANT CHANGE UP (ref 150–400)
POTASSIUM SERPL-MCNC: 4.1 MMOL/L — SIGNIFICANT CHANGE UP (ref 3.5–5.3)
POTASSIUM SERPL-SCNC: 4.1 MMOL/L — SIGNIFICANT CHANGE UP (ref 3.5–5.3)
RBC # BLD: 4.93 M/UL — SIGNIFICANT CHANGE UP (ref 4.2–5.8)
RBC # FLD: 12.8 % — SIGNIFICANT CHANGE UP (ref 10.3–14.5)
RSV RNA NPH QL NAA+NON-PROBE: SIGNIFICANT CHANGE UP
SARS-COV-2 RNA SPEC QL NAA+PROBE: SIGNIFICANT CHANGE UP
SODIUM SERPL-SCNC: 140 MMOL/L — SIGNIFICANT CHANGE UP (ref 135–145)
WBC # BLD: 9.38 K/UL — SIGNIFICANT CHANGE UP (ref 3.8–10.5)
WBC # FLD AUTO: 9.38 K/UL — SIGNIFICANT CHANGE UP (ref 3.8–10.5)

## 2024-11-10 PROCEDURE — 96375 TX/PRO/DX INJ NEW DRUG ADDON: CPT

## 2024-11-10 PROCEDURE — 71046 X-RAY EXAM CHEST 2 VIEWS: CPT | Mod: 26

## 2024-11-10 PROCEDURE — 71046 X-RAY EXAM CHEST 2 VIEWS: CPT

## 2024-11-10 PROCEDURE — 99284 EMERGENCY DEPT VISIT MOD MDM: CPT

## 2024-11-10 PROCEDURE — 99284 EMERGENCY DEPT VISIT MOD MDM: CPT | Mod: 25

## 2024-11-10 PROCEDURE — 85027 COMPLETE CBC AUTOMATED: CPT

## 2024-11-10 PROCEDURE — 36415 COLL VENOUS BLD VENIPUNCTURE: CPT

## 2024-11-10 PROCEDURE — 80048 BASIC METABOLIC PNL TOTAL CA: CPT

## 2024-11-10 PROCEDURE — 96374 THER/PROPH/DIAG INJ IV PUSH: CPT

## 2024-11-10 PROCEDURE — 87637 SARSCOV2&INF A&B&RSV AMP PRB: CPT

## 2024-11-10 RX ORDER — SODIUM CHLORIDE 9 MG/ML
1000 INJECTION, SOLUTION INTRAMUSCULAR; INTRAVENOUS; SUBCUTANEOUS ONCE
Refills: 0 | Status: COMPLETED | OUTPATIENT
Start: 2024-11-10 | End: 2024-11-10

## 2024-11-10 RX ORDER — KETOROLAC TROMETHAMINE 30 MG/ML
15 INJECTION INTRAMUSCULAR; INTRAVENOUS ONCE
Refills: 0 | Status: DISCONTINUED | OUTPATIENT
Start: 2024-11-10 | End: 2024-11-10

## 2024-11-10 RX ORDER — ACETAMINOPHEN 500 MG
1000 TABLET ORAL ONCE
Refills: 0 | Status: COMPLETED | OUTPATIENT
Start: 2024-11-10 | End: 2024-11-10

## 2024-11-10 RX ORDER — IBUPROFEN 200 MG
1 TABLET ORAL
Qty: 42 | Refills: 0
Start: 2024-11-10 | End: 2024-11-23

## 2024-11-10 RX ORDER — ACETAMINOPHEN 500 MG
2 TABLET ORAL
Qty: 112 | Refills: 0
Start: 2024-11-10 | End: 2024-11-23

## 2024-11-10 RX ADMIN — SODIUM CHLORIDE 1000 MILLILITER(S): 9 INJECTION, SOLUTION INTRAMUSCULAR; INTRAVENOUS; SUBCUTANEOUS at 15:26

## 2024-11-10 RX ADMIN — Medication 400 MILLIGRAM(S): at 15:27

## 2024-11-10 RX ADMIN — KETOROLAC TROMETHAMINE 15 MILLIGRAM(S): 30 INJECTION INTRAMUSCULAR; INTRAVENOUS at 15:28

## 2024-11-10 NOTE — ED PROVIDER NOTE - PRINCIPAL DIAGNOSIS
ONCOLOGIC HISTORY: Metastatic sarcoma.       She has a history of lymphoma treated with high-dose chemotherapy and stem cell transplant.  She has had radiation to her right femur.       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       PET scan 07/10/2023-radiotracer avid soft tissue mass in the upper abdomen, likely arising from the pancreas, extensive uptake in the right hip, femur and knee fixation, heterogeneous hepatic activity.     Liver MRI 07/17/2023-findings highly suspicious for primary pancreatic malignancy involving the head, neck and proximal body of the pancreas.  No metastatic disease to the liver.     Pancreatic body FNA 07/27/2023-positive for malignancy, metastatic high-grade sarcoma.     INTERVAL HISTORY:   Louise presents today for follow up after treatment.  She has noted a decrease in the edema this week with use of diuretic and lymphedema treatments at home. She denies any nausea, vomiting, fevers, chills, night sweats or other new pain. Her appetite is good.      ALLERGIES AND MEDICATIONS:   As documented in Epic, was reviewed with the patient.    PAST MEDICAL, SURGICAL, SOCIAL, AND FAMILY HISTORY:   Reviewed with patient and are unchanged.    REVIEW OF SYSTEMS:   As documented in Epic, was reviewed with the patient.    Psychosocial review does not reveal any acute depression or anxiety requiring intervention at this time.    Pain Assessment: No additional intervention needed today.    Patient states taking all medications as prescribed.    Distress Score   Distress Management Group      Distress Scale (0-10)        PHYSICAL EXAMINATION:    VITALS:   Oncology Encounter Vitals [12/20/23 0951]   ONC OP Encounter Vitals Group      BP (!) 158/70      Heart Rate 80      Resp 16      Temp 98 °F (36.7 °C)      Temp src Temporal      SpO2 100 %      Weight 163 lb 9.3 oz (74.2 kg)      Height       Pain Score 4      Pain Location        Pain Education?       BSA (Calculated - m2) - [employer]       BSA (Calculated - sq m)       BMI (Calculated)      ECO - No physically strenuous activity, but ambulatory and able to carry out light or sedentary work.  GENERAL: Awake, alert, and oriented and appears in no acute distress.   HEENT: Normocephalic atraumatic. Eyes sclerae white. Conjunctivae clear. Extraocular movements are intact.   CHEST: Lungs clear.   HEART: Regular rate and rhythm.   ABDOMEN: Soft, nontender.   EXTREMITIES: Bilateral lower legs with 1-2+ edema, left greater than right.    DIAGNOSTICS:    Lab Results   Component Value Date/Time    WBC 7.0 2023 09:07 AM    WBC 6.8 2020 03:20 PM    RBC 2.49 (L) 2023 09:07 AM    RBC 4.55 2020 03:20 PM    HGB 7.5 (L) 2023 09:07 AM    HGB 13.7 2020 03:20 PM    HCT 23.8 (L) 2023 09:07 AM    HCT 42.0 2020 03:20 PM     2023 09:07 AM     2020 03:20 PM     2013 04:46 PM    MCV 95.6 2023 09:07 AM    MCV 92.3 2020 03:20 PM    MCH 30.1 2023 09:07 AM    MCH 30.1 2020 03:20 PM    MCHC 31.5 (L) 2023 09:07 AM    MCHC 32.6 2020 03:20 PM    RDWCV 19.4 (H) 2023 09:07 AM    RDWCV 13.7 2020 03:20 PM    SEG 59 2023 09:07 AM    SEG 68 2020 03:20 PM    TLYMPH 18 2023 09:07 AM    TLYMPH 22 2020 03:20 PM    PMON 20 2023 09:07 AM    PMON 8 2020 03:20 PM    PEOS 1 2023 09:07 AM    PEOS 2 2020 03:20 PM    PBASO 0 2023 09:07 AM    PBASO 0 2020 03:20 PM    ANEUT 4.2 2023 09:07 AM    ANEUT 4.5 2020 03:20 PM    ALYMS 1.2 2023 09:07 AM    ALYMS 1.5 2020 03:20 PM    CRISTINE 1.4 (H) 2023 09:07 AM    CRISTINE 0.6 2020 03:20 PM    AEOS 0.1 2023 09:07 AM    AEOS 0.1 2020 03:20 PM    ABASO 0.0 2023 09:07 AM    ABASO 0.0 2020 03:20 PM     CMP reviewed  Magnesium 1.9    IMPRESSION:    Stage  IV Sarcoma  Antineoplastic chemotherapy induced anemia- no transfusion needed at this time.     PLAN: Proceed today with treatment. Follow up appointments were scheduled and reviewed. If she has any complaints or concerns in the interim she is encouraged to notify the office. She demonstrates a good understanding of all this information and is in agreement with the plan.      PINA SMITH DNP, APRN-BC     Upper respiratory symptom

## 2024-11-10 NOTE — ED PROVIDER NOTE - PATIENT PORTAL LINK FT
You can access the FollowMyHealth Patient Portal offered by [facility] by registering at the following website: [email or web address]. By joining [employer]’s FollowMyHealth portal, you will also be able to view your health information using other applications (apps) compatible with our system.

## 2024-11-10 NOTE — ED PROVIDER NOTE - NSFOLLOWUPINSTRUCTIONS_ED_ALL_ED_FT
You were evaluated in the Emergency Department today for your congestion, cough and fevers. Your evaluation suggests that your symptoms are most likely due to a viral illness, which will improve on its own with rest and fluids.    We recommend you take 600mg ibuprofen every 6 hours or Tylenol 650mg every 6 hours as needed for fever. If needed, you can alternate these medications so that you take one medication every 3 hours. For instance, at noon take ibuprofen, then at 3pm take Tylenol, then at 6pm take ibuprofen.    Please schedule an appointment for follow up with your primary care physician this week.    Return to the Emergency Department if you experience worsening cough, fever 100.4 ° F or greater not controlled by Tylenol or Ibuprofen, recurrent vomiting, chest pain, shortness of breath, or any other concerning symptoms.

## 2024-11-10 NOTE — ED PROVIDER NOTE - OBJECTIVE STATEMENT
54 male presents for fever body aches and cough.    GENERAL APPEARANCE:  AAOx3, generally well-appearing, no acute distress. Appears stated age.  HEENT:  NCAT. Moist mucous membranes. EOMI, clear conjunctiva, no slceral icterus, oropharynx clear.  NECK:  Supple without lymphadenopathy. No JVD.  No neck stiffness or restricted ROM.  HEART:  Normal heart rate and regular rhythm. No murmur.   LUNGS:  CTAB, moving air well. No crackles or wheezes are heard.  ABDOMEN:  Soft, nontender, non-distended. Negative Wilson. Negative McBurney. No rebound or guarding.  CHEST/BACK: Chest wall non-tender. No CVAT, or midline cervical/thoracic/lumbar tenderness to palpation. No obvious deformity of chest wall or back.  EXTREMITIES:  Without cyanosis, clubbing or edema. Pulse intact x 4. FROM x4. Compartments soft in all extremities.  NEUROLOGICAL:  Grossly non-focal. Alert and oriented, moving all 4 extremities. CN II-XII grossly intact. Observed to ambulate with normal gait.  SKIN:  Warm and dry without any rash.  PSYCH: Calm, cooperative. Normal mood and affect. Demonstrates proper insight and judgement.

## 2024-11-10 NOTE — ED PROVIDER NOTE - CLINICAL SUMMARY MEDICAL DECISION MAKING FREE TEXT BOX
This patient presents with symptoms suspicious for likely viral upper respiratory infection. Based on history and physical doubt sinusitis.       Do not suspect underlying cardiopulmonary process. I considered, but think unlikely, dangerous causes of this patient’s symptoms to include ACS, CHF or COPD exacerbations, pneumonia, pneumothorax.

## 2024-12-24 ENCOUNTER — EMERGENCY (EMERGENCY)
Facility: HOSPITAL | Age: 54
LOS: 1 days | Discharge: HOME | End: 2024-12-24
Attending: STUDENT IN AN ORGANIZED HEALTH CARE EDUCATION/TRAINING PROGRAM
Payer: COMMERCIAL

## 2024-12-24 VITALS
OXYGEN SATURATION: 96 % | DIASTOLIC BLOOD PRESSURE: 91 MMHG | RESPIRATION RATE: 17 BRPM | SYSTOLIC BLOOD PRESSURE: 144 MMHG | TEMPERATURE: 100 F | HEIGHT: 67 IN | WEIGHT: 169.98 LBS | HEART RATE: 104 BPM

## 2024-12-24 PROCEDURE — 99284 EMERGENCY DEPT VISIT MOD MDM: CPT

## 2024-12-24 RX ORDER — IBUPROFEN 200 MG
600 TABLET ORAL ONCE
Refills: 0 | Status: COMPLETED | OUTPATIENT
Start: 2024-12-24 | End: 2024-12-24

## 2024-12-24 RX ORDER — ACETAMINOPHEN 500MG 500 MG/1
975 TABLET, COATED ORAL ONCE
Refills: 0 | Status: COMPLETED | OUTPATIENT
Start: 2024-12-24 | End: 2024-12-24

## 2024-12-24 RX ADMIN — Medication 600 MILLIGRAM(S): at 23:19

## 2024-12-25 VITALS
TEMPERATURE: 98 F | SYSTOLIC BLOOD PRESSURE: 130 MMHG | OXYGEN SATURATION: 96 % | HEART RATE: 81 BPM | RESPIRATION RATE: 18 BRPM | DIASTOLIC BLOOD PRESSURE: 80 MMHG

## 2024-12-25 LAB
ALBUMIN SERPL ELPH-MCNC: 3.5 G/DL — SIGNIFICANT CHANGE UP (ref 3.5–5)
ALP SERPL-CCNC: 56 U/L — SIGNIFICANT CHANGE UP (ref 40–120)
ALT FLD-CCNC: 23 U/L DA — SIGNIFICANT CHANGE UP (ref 10–60)
ANION GAP SERPL CALC-SCNC: 4 MMOL/L — LOW (ref 5–17)
AST SERPL-CCNC: 19 U/L — SIGNIFICANT CHANGE UP (ref 10–40)
BASOPHILS # BLD AUTO: 0.04 K/UL — SIGNIFICANT CHANGE UP (ref 0–0.2)
BASOPHILS NFR BLD AUTO: 0.4 % — SIGNIFICANT CHANGE UP (ref 0–2)
BILIRUB SERPL-MCNC: 1 MG/DL — SIGNIFICANT CHANGE UP (ref 0.2–1.2)
BUN SERPL-MCNC: 23 MG/DL — HIGH (ref 7–18)
CALCIUM SERPL-MCNC: 8.9 MG/DL — SIGNIFICANT CHANGE UP (ref 8.4–10.5)
CHLORIDE SERPL-SCNC: 107 MMOL/L — SIGNIFICANT CHANGE UP (ref 96–108)
CO2 SERPL-SCNC: 25 MMOL/L — SIGNIFICANT CHANGE UP (ref 22–31)
CREAT SERPL-MCNC: 1.18 MG/DL — SIGNIFICANT CHANGE UP (ref 0.5–1.3)
EGFR: 72 ML/MIN/1.73M2 — SIGNIFICANT CHANGE UP
EOSINOPHIL # BLD AUTO: 0.11 K/UL — SIGNIFICANT CHANGE UP (ref 0–0.5)
EOSINOPHIL NFR BLD AUTO: 1.2 % — SIGNIFICANT CHANGE UP (ref 0–6)
FLUAV AG NPH QL: DETECTED
FLUBV AG NPH QL: SIGNIFICANT CHANGE UP
GLUCOSE SERPL-MCNC: 104 MG/DL — HIGH (ref 70–99)
HCT VFR BLD CALC: 45.4 % — SIGNIFICANT CHANGE UP (ref 39–50)
HGB BLD-MCNC: 16 G/DL — SIGNIFICANT CHANGE UP (ref 13–17)
IMM GRANULOCYTES NFR BLD AUTO: 0.3 % — SIGNIFICANT CHANGE UP (ref 0–0.9)
LYMPHOCYTES # BLD AUTO: 2 K/UL — SIGNIFICANT CHANGE UP (ref 1–3.3)
LYMPHOCYTES # BLD AUTO: 21.5 % — SIGNIFICANT CHANGE UP (ref 13–44)
MCHC RBC-ENTMCNC: 33.7 PG — SIGNIFICANT CHANGE UP (ref 27–34)
MCHC RBC-ENTMCNC: 35.2 G/DL — SIGNIFICANT CHANGE UP (ref 32–36)
MCV RBC AUTO: 95.6 FL — SIGNIFICANT CHANGE UP (ref 80–100)
MONOCYTES # BLD AUTO: 1.42 K/UL — HIGH (ref 0–0.9)
MONOCYTES NFR BLD AUTO: 15.2 % — HIGH (ref 2–14)
NEUTROPHILS # BLD AUTO: 5.72 K/UL — SIGNIFICANT CHANGE UP (ref 1.8–7.4)
NEUTROPHILS NFR BLD AUTO: 61.4 % — SIGNIFICANT CHANGE UP (ref 43–77)
NRBC # BLD: 0 /100 WBCS — SIGNIFICANT CHANGE UP (ref 0–0)
PLATELET # BLD AUTO: 179 K/UL — SIGNIFICANT CHANGE UP (ref 150–400)
POTASSIUM SERPL-MCNC: 4 MMOL/L — SIGNIFICANT CHANGE UP (ref 3.5–5.3)
POTASSIUM SERPL-SCNC: 4 MMOL/L — SIGNIFICANT CHANGE UP (ref 3.5–5.3)
PROT SERPL-MCNC: 7.3 G/DL — SIGNIFICANT CHANGE UP (ref 6–8.3)
RBC # BLD: 4.75 M/UL — SIGNIFICANT CHANGE UP (ref 4.2–5.8)
RBC # FLD: 12.3 % — SIGNIFICANT CHANGE UP (ref 10.3–14.5)
RSV RNA NPH QL NAA+NON-PROBE: SIGNIFICANT CHANGE UP
SARS-COV-2 RNA SPEC QL NAA+PROBE: SIGNIFICANT CHANGE UP
SODIUM SERPL-SCNC: 136 MMOL/L — SIGNIFICANT CHANGE UP (ref 135–145)
WBC # BLD: 9.32 K/UL — SIGNIFICANT CHANGE UP (ref 3.8–10.5)
WBC # FLD AUTO: 9.32 K/UL — SIGNIFICANT CHANGE UP (ref 3.8–10.5)

## 2024-12-25 PROCEDURE — 80053 COMPREHEN METABOLIC PANEL: CPT

## 2024-12-25 PROCEDURE — 85025 COMPLETE CBC W/AUTO DIFF WBC: CPT

## 2024-12-25 PROCEDURE — 99284 EMERGENCY DEPT VISIT MOD MDM: CPT | Mod: 25

## 2024-12-25 PROCEDURE — 36415 COLL VENOUS BLD VENIPUNCTURE: CPT

## 2024-12-25 PROCEDURE — 96374 THER/PROPH/DIAG INJ IV PUSH: CPT

## 2024-12-25 PROCEDURE — 87637 SARSCOV2&INF A&B&RSV AMP PRB: CPT

## 2024-12-25 RX ORDER — SODIUM CHLORIDE 9 MG/ML
1000 INJECTION, SOLUTION INTRAMUSCULAR; INTRAVENOUS; SUBCUTANEOUS ONCE
Refills: 0 | Status: COMPLETED | OUTPATIENT
Start: 2024-12-25 | End: 2024-12-25

## 2024-12-25 RX ORDER — METOCLOPRAMIDE HYDROCHLORIDE 10 MG/1
10 TABLET ORAL ONCE
Refills: 0 | Status: COMPLETED | OUTPATIENT
Start: 2024-12-25 | End: 2024-12-25

## 2024-12-25 RX ADMIN — METOCLOPRAMIDE HYDROCHLORIDE 10 MILLIGRAM(S): 10 TABLET ORAL at 00:40

## 2024-12-25 RX ADMIN — SODIUM CHLORIDE 1000 MILLILITER(S): 9 INJECTION, SOLUTION INTRAMUSCULAR; INTRAVENOUS; SUBCUTANEOUS at 00:40

## 2024-12-25 NOTE — ED PROVIDER NOTE - NS ED ROS FT
Constitutional: no fevers, +chills  HEENT: +HA, no vision changes, +rhinorrhea, sore throat  Cardiac: no chest pain, palpitations  Respiratory: no SOB, +cough no hemoptysis  GI: no n/v/d/c, abd pain, bloody or dark stools  : no dysuria, frequency, or hematuria  MSK: no joint pain, neck pain or back pain  Skin: no rashes, jaundice, pruritis  Neuro: no numbness/tingling, weakness, unsteady gait  ROS otherwise neg except per MDM

## 2024-12-25 NOTE — ED PROVIDER NOTE - PATIENT PORTAL LINK FT
You can access the FollowMyHealth Patient Portal offered by [facility] by registering at the following website: [email or web address]. By joining [employer]’s FollowMyHealth portal, you will also be able to view your health information using other applications (apps) compatible with our system.

## 2024-12-25 NOTE — ED PROVIDER NOTE - CLINICAL SUMMARY MEDICAL DECISION MAKING FREE TEXT BOX
54-year-old male HIV on Biktarvy undetectable viral levels compliant with medications presents with 3 days of headache, rhinorrhea, sore throat, cough.  Headache delayed in onset.  First time fever in triage 100.4 without antipyretics.  No meningismus/altered mental status/double worsening affect cause concern for sinusitis.  No foul discharge from the nose reported or seen.  Clear cardiopulmonary exam, benign abdomen.  Pt effectively immunocompetent. Likely viral syndrome will swab provide analgesia and reassess. Patient is a 40yo female complaining of nausea and vomiting since yesterday afternoon Patient has started a new weight lost medication and has been vomiting since. Patient was seen at Urgent Care and was told that she has a UTI. Patient was sent to the [facility] for possible kidney infection. Patient denies fever chest pain shortness of breath cough

## 2024-12-25 NOTE — ED PROVIDER NOTE - PROGRESS NOTE DETAILS
Eufemia Ramos (Rodriguez), DO pain minimally improved after max tylenol and motrin. will obtain IV access to give reglan fluids and check basic labs. Eufemia (Jaleel) Richard,  pt feeling largely improved, flu+ return precautions provided. ok for dc.

## 2024-12-25 NOTE — ED PROVIDER NOTE - NSFOLLOWUPINSTRUCTIONS_ED_ALL_ED_FT
You were seen in the Emergency Department for virus.     1) Advance activity as tolerated.   2) Continue all previously prescribed medications as directed.    3) Follow up with your primary care physician in 24-48 hours - take copies of your results.    4) Return to the Emergency Department for worsening or persistent symptoms, and/or ANY NEW OR CONCERNING SYMPTOMS.      Continue to be compliant with your medications which is important for your abdomen status.  Return for any weakness, numbness, vision changes, vomiting, foul discharge from the nose or any other new or concerning symptoms.  Follow-up with your primary care doctor soon as possible. You were seen in the Emergency Department for influenza    1) Advance activity as tolerated.   2) Continue all previously prescribed medications as directed.    3) Follow up with your primary care physician in 24-48 hours - take copies of your results.    4) Return to the Emergency Department for worsening or persistent symptoms, and/or ANY NEW OR CONCERNING SYMPTOMS.      Continue to be compliant with your medications which is important for your abdomen status.  Return for any weakness, numbness, vision changes, vomiting, foul discharge from the nose or any other new or concerning symptoms.  Follow-up with your primary care doctor soon as possible.

## 2024-12-25 NOTE — ED PROVIDER NOTE - PHYSICAL EXAMINATION
General: non-toxic, NAD  HEENT: NCAT, PERRL, no sinus tenderness to percussion.   Cardiac: RRR, no murmurs, 2+ peripheral pulses  Resp: CTAB  Abdomen: soft, non-distended, bowel sounds present, no ttp, no rebound or guarding. no organomegaly  Extremities: no peripheral edema, calf tenderness, or leg size discrepancies  Skin: no rashes  Neuro: AAOx4, 5+motor, sensation grossly intact CN 2-12 intact, no nuchal rigidity.   Psych: mood and affect appropriate

## 2025-01-15 ENCOUNTER — EMERGENCY (EMERGENCY)
Facility: HOSPITAL | Age: 55
LOS: 1 days | Discharge: HOME | End: 2025-01-15
Attending: EMERGENCY MEDICINE
Payer: COMMERCIAL

## 2025-01-15 VITALS
OXYGEN SATURATION: 95 % | SYSTOLIC BLOOD PRESSURE: 151 MMHG | TEMPERATURE: 99 F | DIASTOLIC BLOOD PRESSURE: 91 MMHG | HEIGHT: 68 IN | HEART RATE: 75 BPM | RESPIRATION RATE: 18 BRPM | WEIGHT: 173.06 LBS

## 2025-01-15 VITALS
DIASTOLIC BLOOD PRESSURE: 76 MMHG | OXYGEN SATURATION: 95 % | SYSTOLIC BLOOD PRESSURE: 120 MMHG | HEART RATE: 80 BPM | TEMPERATURE: 97 F | RESPIRATION RATE: 17 BRPM

## 2025-01-15 LAB
ALBUMIN SERPL ELPH-MCNC: 3.9 G/DL — SIGNIFICANT CHANGE UP (ref 3.5–5)
ALP SERPL-CCNC: 59 U/L — SIGNIFICANT CHANGE UP (ref 40–120)
ALT FLD-CCNC: 24 U/L DA — SIGNIFICANT CHANGE UP (ref 10–60)
ANION GAP SERPL CALC-SCNC: 7 MMOL/L — SIGNIFICANT CHANGE UP (ref 5–17)
APPEARANCE UR: CLEAR — SIGNIFICANT CHANGE UP
AST SERPL-CCNC: 12 U/L — SIGNIFICANT CHANGE UP (ref 10–40)
BASOPHILS # BLD AUTO: 0.03 K/UL — SIGNIFICANT CHANGE UP (ref 0–0.2)
BASOPHILS NFR BLD AUTO: 0.4 % — SIGNIFICANT CHANGE UP (ref 0–2)
BILIRUB SERPL-MCNC: 1.3 MG/DL — HIGH (ref 0.2–1.2)
BILIRUB UR-MCNC: NEGATIVE — SIGNIFICANT CHANGE UP
BUN SERPL-MCNC: 15 MG/DL — SIGNIFICANT CHANGE UP (ref 7–18)
CALCIUM SERPL-MCNC: 9.2 MG/DL — SIGNIFICANT CHANGE UP (ref 8.4–10.5)
CHLORIDE SERPL-SCNC: 107 MMOL/L — SIGNIFICANT CHANGE UP (ref 96–108)
CO2 SERPL-SCNC: 25 MMOL/L — SIGNIFICANT CHANGE UP (ref 22–31)
COLOR SPEC: YELLOW — SIGNIFICANT CHANGE UP
CREAT SERPL-MCNC: 1.24 MG/DL — SIGNIFICANT CHANGE UP (ref 0.5–1.3)
DIFF PNL FLD: NEGATIVE — SIGNIFICANT CHANGE UP
EGFR: 68 ML/MIN/1.73M2 — SIGNIFICANT CHANGE UP
EOSINOPHIL # BLD AUTO: 0.17 K/UL — SIGNIFICANT CHANGE UP (ref 0–0.5)
EOSINOPHIL NFR BLD AUTO: 2.1 % — SIGNIFICANT CHANGE UP (ref 0–6)
GLUCOSE SERPL-MCNC: 101 MG/DL — HIGH (ref 70–99)
GLUCOSE UR QL: NEGATIVE MG/DL — SIGNIFICANT CHANGE UP
HCT VFR BLD CALC: 46.7 % — SIGNIFICANT CHANGE UP (ref 39–50)
HGB BLD-MCNC: 16.3 G/DL — SIGNIFICANT CHANGE UP (ref 13–17)
IMM GRANULOCYTES NFR BLD AUTO: 0.4 % — SIGNIFICANT CHANGE UP (ref 0–0.9)
KETONES UR-MCNC: 15 MG/DL
LEUKOCYTE ESTERASE UR-ACNC: NEGATIVE — SIGNIFICANT CHANGE UP
LIDOCAIN IGE QN: 32 U/L — SIGNIFICANT CHANGE UP (ref 13–75)
LYMPHOCYTES # BLD AUTO: 1.92 K/UL — SIGNIFICANT CHANGE UP (ref 1–3.3)
LYMPHOCYTES # BLD AUTO: 23.3 % — SIGNIFICANT CHANGE UP (ref 13–44)
MCHC RBC-ENTMCNC: 33.3 PG — SIGNIFICANT CHANGE UP (ref 27–34)
MCHC RBC-ENTMCNC: 34.9 G/DL — SIGNIFICANT CHANGE UP (ref 32–36)
MCV RBC AUTO: 95.5 FL — SIGNIFICANT CHANGE UP (ref 80–100)
MONOCYTES # BLD AUTO: 0.58 K/UL — SIGNIFICANT CHANGE UP (ref 0–0.9)
MONOCYTES NFR BLD AUTO: 7 % — SIGNIFICANT CHANGE UP (ref 2–14)
NEUTROPHILS # BLD AUTO: 5.5 K/UL — SIGNIFICANT CHANGE UP (ref 1.8–7.4)
NEUTROPHILS NFR BLD AUTO: 66.8 % — SIGNIFICANT CHANGE UP (ref 43–77)
NITRITE UR-MCNC: NEGATIVE — SIGNIFICANT CHANGE UP
NRBC # BLD: 0 /100 WBCS — SIGNIFICANT CHANGE UP (ref 0–0)
PH UR: 6 — SIGNIFICANT CHANGE UP (ref 5–8)
PLATELET # BLD AUTO: 303 K/UL — SIGNIFICANT CHANGE UP (ref 150–400)
POTASSIUM SERPL-MCNC: 4.2 MMOL/L — SIGNIFICANT CHANGE UP (ref 3.5–5.3)
POTASSIUM SERPL-SCNC: 4.2 MMOL/L — SIGNIFICANT CHANGE UP (ref 3.5–5.3)
PROT SERPL-MCNC: 7.6 G/DL — SIGNIFICANT CHANGE UP (ref 6–8.3)
PROT UR-MCNC: NEGATIVE MG/DL — SIGNIFICANT CHANGE UP
RBC # BLD: 4.89 M/UL — SIGNIFICANT CHANGE UP (ref 4.2–5.8)
RBC # FLD: 12.3 % — SIGNIFICANT CHANGE UP (ref 10.3–14.5)
SODIUM SERPL-SCNC: 139 MMOL/L — SIGNIFICANT CHANGE UP (ref 135–145)
SP GR SPEC: 1.02 — SIGNIFICANT CHANGE UP (ref 1–1.03)
TROPONIN I, HIGH SENSITIVITY RESULT: 5.2 NG/L — SIGNIFICANT CHANGE UP
UROBILINOGEN FLD QL: 0.2 MG/DL — SIGNIFICANT CHANGE UP (ref 0.2–1)
WBC # BLD: 8.23 K/UL — SIGNIFICANT CHANGE UP (ref 3.8–10.5)
WBC # FLD AUTO: 8.23 K/UL — SIGNIFICANT CHANGE UP (ref 3.8–10.5)

## 2025-01-15 PROCEDURE — 99285 EMERGENCY DEPT VISIT HI MDM: CPT | Mod: 25

## 2025-01-15 PROCEDURE — 99285 EMERGENCY DEPT VISIT HI MDM: CPT

## 2025-01-15 PROCEDURE — 80053 COMPREHEN METABOLIC PANEL: CPT

## 2025-01-15 PROCEDURE — 83690 ASSAY OF LIPASE: CPT

## 2025-01-15 PROCEDURE — 96360 HYDRATION IV INFUSION INIT: CPT

## 2025-01-15 PROCEDURE — 71045 X-RAY EXAM CHEST 1 VIEW: CPT

## 2025-01-15 PROCEDURE — 36415 COLL VENOUS BLD VENIPUNCTURE: CPT

## 2025-01-15 PROCEDURE — 71045 X-RAY EXAM CHEST 1 VIEW: CPT | Mod: 26

## 2025-01-15 PROCEDURE — 93005 ELECTROCARDIOGRAM TRACING: CPT

## 2025-01-15 PROCEDURE — 85025 COMPLETE CBC W/AUTO DIFF WBC: CPT

## 2025-01-15 PROCEDURE — 81003 URINALYSIS AUTO W/O SCOPE: CPT

## 2025-01-15 PROCEDURE — 84484 ASSAY OF TROPONIN QUANT: CPT

## 2025-01-15 RX ORDER — SODIUM CHLORIDE 9 MG/ML
1000 INJECTION, SOLUTION INTRAMUSCULAR; INTRAVENOUS; SUBCUTANEOUS ONCE
Refills: 0 | Status: COMPLETED | OUTPATIENT
Start: 2025-01-15 | End: 2025-01-15

## 2025-01-15 RX ORDER — PANTOPRAZOLE 40 MG/1
1 TABLET, DELAYED RELEASE ORAL
Qty: 20 | Refills: 0
Start: 2025-01-15

## 2025-01-15 RX ADMIN — SODIUM CHLORIDE 1000 MILLILITER(S): 9 INJECTION, SOLUTION INTRAMUSCULAR; INTRAVENOUS; SUBCUTANEOUS at 04:23

## 2025-01-15 RX ADMIN — SODIUM CHLORIDE 1000 MILLILITER(S): 9 INJECTION, SOLUTION INTRAMUSCULAR; INTRAVENOUS; SUBCUTANEOUS at 05:23

## 2025-01-15 NOTE — ED ADULT NURSE NOTE - OBJECTIVE STATEMENT
pt BRAN [employer] employee came in to er for feeling of something is stuck in his epigastrium. pt denies chest pain.

## 2025-01-15 NOTE — ED PROVIDER NOTE - CLINICAL SUMMARY MEDICAL DECISION MAKING FREE TEXT BOX
Pt feels better, has no guarding to repeat abdominal palpation, able to eat and drink without vomiting. Pt will f/u with GI-contact provided.   Pt is well appearing, has no new complaints and able to walk with normal gait. Pt is stable for discharge and follow up with medical doctor. Pt educated on care and need for follow up. Discussed anticipatory guidance and return precautions. Questions answered. I had a detailed discussion with the patient regarding the historical points, exam findings, and any diagnostic results supporting the discharge diagnosis.

## 2025-01-15 NOTE — ED ADULT NURSE NOTE - NSFALLUNIVINTERV_ED_ALL_ED
Bed/Stretcher in lowest position, wheels locked, appropriate side rails in place/Call bell, personal items and telephone in reach/Instruct patient to call for assistance before getting out of bed/chair/stretcher/Non-slip footwear applied when patient is off stretcher/[place] to call system/Physically safe environment - no spills, clutter or unnecessary equipment/Purposeful proactive rounding/Room/bathroom lighting operational, light cord in reach
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
## 2025-01-15 NOTE — ED PROVIDER NOTE - NSFOLLOWUPCLINICS_GEN_ALL_ED_FT
[facility]  Gastroenterology  [address] [place] [address]  Phone: [phone]  Fax: [phone]

## 2025-01-15 NOTE — ED ADULT TRIAGE NOTE - CHIEF COMPLAINT QUOTE
pt stated that after eating he feels like something is sitting in upper part of stomach, denies nausea or vomiting.

## 2025-01-15 NOTE — ED PROVIDER NOTE - NSFOLLOWUPINSTRUCTIONS_ED_ALL_ED_FT
Return to ER immediately if abdominal pain or any pain does not improve, worsens, or persists, fever, vomiting,  blood in stools or having black stools, unable to eat or drink, worsening/persistent diarrhea or constipation, any concerns. See your doctor as soon as possible (within 1-2 days).   If you need further assistance for appointments you can contact the [place] Care Coordinator at [phone]. In addition our outpatient [facility] is located at [address] [place] [address], tele: [phone].

## 2025-01-15 NOTE — ED PROVIDER NOTE - OBJECTIVE STATEMENT
54-year-old male patient states he was eating at 3:00 in the morning and felt as if food bolus was stuck in his esophagus.  At 5:45 AM patient feels much better states food bolus has transition patient able to eat and drink without difficulties.  Patient denies any chest pain, no shortness of breath, no vomiting, no current abdominal pain.  Adamantly requesting to be discharged and refuses any further radiologic diagnostics.  Patient states he is compliant with all his medications including antiretrovirals and that his CD4 count is elevated and his viral load is undetectable.

## 2025-01-15 NOTE — ED PROVIDER NOTE - PATIENT PORTAL LINK FT
You can access the FollowMyHealth Patient Portal offered by [facility] by registering at the following website: [email or web address]. By joining [facility]’s FollowMyHealth portal, you will also be able to view your health information using other applications (apps) compatible with our system.

## 2025-02-17 NOTE — ED PROVIDER NOTE - WR ORDER ID 1
Patient was diagnosed with ear infection on the 2nd of Feb.  She took the medication but often spit it out or vomit it up. Mom says she is still pulling on her left ear.  No fevers    Patient was advised and in agreement they do meet Urgent Care criteria based on triage symptoms. Patient advised if provider feels that their problem is more complex, they may be upgraded from an Urgent Care visit to an Emergency Department visit per provider direction.      [id number]

## 2025-03-27 NOTE — ED ADULT NURSE NOTE - MODE OF DISCHARGE
[TextEntry] : keep head elevated continue solids: incr fruits / vegs prunes / pears prune / pear juice to ff Ambulatory

## 2025-05-15 ENCOUNTER — EMERGENCY (EMERGENCY)
Facility: HOSPITAL | Age: 55
LOS: 1 days | End: 2025-05-15
Attending: EMERGENCY MEDICINE
Payer: COMMERCIAL

## 2025-05-15 VITALS
HEART RATE: 88 BPM | RESPIRATION RATE: 18 BRPM | SYSTOLIC BLOOD PRESSURE: 156 MMHG | DIASTOLIC BLOOD PRESSURE: 95 MMHG | OXYGEN SATURATION: 98 % | HEIGHT: 68 IN | TEMPERATURE: 99 F | WEIGHT: 171.3 LBS

## 2025-05-15 VITALS
RESPIRATION RATE: 19 BRPM | OXYGEN SATURATION: 95 % | SYSTOLIC BLOOD PRESSURE: 122 MMHG | DIASTOLIC BLOOD PRESSURE: 75 MMHG | TEMPERATURE: 98 F | HEART RATE: 75 BPM

## 2025-05-15 PROCEDURE — 96374 THER/PROPH/DIAG INJ IV PUSH: CPT

## 2025-05-15 PROCEDURE — 99285 EMERGENCY DEPT VISIT HI MDM: CPT

## 2025-05-15 PROCEDURE — 99284 EMERGENCY DEPT VISIT MOD MDM: CPT | Mod: 25

## 2025-05-15 RX ORDER — NITROGLYCERIN 20 MG/G
0.4 OINTMENT TOPICAL ONCE
Refills: 0 | Status: COMPLETED | OUTPATIENT
Start: 2025-05-15 | End: 2025-05-15

## 2025-05-15 RX ADMIN — Medication 40 MILLIGRAM(S): at 03:55

## 2025-05-15 RX ADMIN — NITROGLYCERIN 0.4 MILLIGRAM(S): 20 OINTMENT TOPICAL at 03:55

## 2025-08-04 ENCOUNTER — EMERGENCY (EMERGENCY)
Facility: HOSPITAL | Age: 55
LOS: 1 days | End: 2025-08-04
Attending: STUDENT IN AN ORGANIZED HEALTH CARE EDUCATION/TRAINING PROGRAM
Payer: COMMERCIAL

## 2025-08-04 VITALS
RESPIRATION RATE: 19 BRPM | OXYGEN SATURATION: 97 % | DIASTOLIC BLOOD PRESSURE: 83 MMHG | TEMPERATURE: 98 F | WEIGHT: 163.14 LBS | SYSTOLIC BLOOD PRESSURE: 116 MMHG | HEART RATE: 90 BPM

## 2025-08-04 PROCEDURE — 99283 EMERGENCY DEPT VISIT LOW MDM: CPT

## 2025-08-04 RX ORDER — OFLOXACIN 0.3 %
5 DROPS OTIC (EAR)
Qty: 70 | Refills: 0
Start: 2025-08-04 | End: 2025-08-10

## 2025-08-07 ENCOUNTER — EMERGENCY (EMERGENCY)
Facility: HOSPITAL | Age: 55
LOS: 1 days | End: 2025-08-07
Attending: STUDENT IN AN ORGANIZED HEALTH CARE EDUCATION/TRAINING PROGRAM
Payer: COMMERCIAL

## 2025-08-07 VITALS
HEART RATE: 57 BPM | TEMPERATURE: 98 F | SYSTOLIC BLOOD PRESSURE: 126 MMHG | DIASTOLIC BLOOD PRESSURE: 83 MMHG | RESPIRATION RATE: 18 BRPM | OXYGEN SATURATION: 96 %

## 2025-08-07 VITALS
DIASTOLIC BLOOD PRESSURE: 88 MMHG | OXYGEN SATURATION: 97 % | TEMPERATURE: 99 F | SYSTOLIC BLOOD PRESSURE: 128 MMHG | HEIGHT: 68 IN | WEIGHT: 166.89 LBS | HEART RATE: 72 BPM | RESPIRATION RATE: 18 BRPM

## 2025-08-07 PROCEDURE — 96374 THER/PROPH/DIAG INJ IV PUSH: CPT

## 2025-08-07 PROCEDURE — 99284 EMERGENCY DEPT VISIT MOD MDM: CPT | Mod: 25

## 2025-08-07 PROCEDURE — 96375 TX/PRO/DX INJ NEW DRUG ADDON: CPT

## 2025-08-07 PROCEDURE — 99284 EMERGENCY DEPT VISIT MOD MDM: CPT

## 2025-08-07 RX ORDER — AMOXICILLIN AND CLAVULANATE POTASSIUM 500; 125 MG/1; MG/1
1 TABLET, FILM COATED ORAL ONCE
Refills: 0 | Status: COMPLETED | OUTPATIENT
Start: 2025-08-07 | End: 2025-08-07

## 2025-08-07 RX ORDER — KETOROLAC TROMETHAMINE 30 MG/ML
15 INJECTION, SOLUTION INTRAMUSCULAR; INTRAVENOUS ONCE
Refills: 0 | Status: DISCONTINUED | OUTPATIENT
Start: 2025-08-07 | End: 2025-08-07

## 2025-08-07 RX ORDER — AMOXICILLIN AND CLAVULANATE POTASSIUM 500; 125 MG/1; MG/1
1 TABLET, FILM COATED ORAL
Qty: 14 | Refills: 0
Start: 2025-08-07 | End: 2025-08-13

## 2025-08-07 RX ORDER — METOCLOPRAMIDE HCL 10 MG
10 TABLET ORAL ONCE
Refills: 0 | Status: COMPLETED | OUTPATIENT
Start: 2025-08-07 | End: 2025-08-07

## 2025-08-07 RX ADMIN — Medication 10 MILLIGRAM(S): at 02:21

## 2025-08-07 RX ADMIN — AMOXICILLIN AND CLAVULANATE POTASSIUM 1 TABLET(S): 500; 125 TABLET, FILM COATED ORAL at 04:32

## 2025-08-07 RX ADMIN — Medication 1000 MILLILITER(S): at 02:22

## 2025-08-07 RX ADMIN — KETOROLAC TROMETHAMINE 15 MILLIGRAM(S): 30 INJECTION, SOLUTION INTRAMUSCULAR; INTRAVENOUS at 02:21

## 2025-08-07 RX ADMIN — KETOROLAC TROMETHAMINE 15 MILLIGRAM(S): 30 INJECTION, SOLUTION INTRAMUSCULAR; INTRAVENOUS at 02:51
